# Patient Record
Sex: FEMALE | Race: BLACK OR AFRICAN AMERICAN | Employment: UNEMPLOYED | URBAN - METROPOLITAN AREA
[De-identification: names, ages, dates, MRNs, and addresses within clinical notes are randomized per-mention and may not be internally consistent; named-entity substitution may affect disease eponyms.]

---

## 2019-07-07 ENCOUNTER — APPOINTMENT (OUTPATIENT)
Dept: GENERAL RADIOLOGY | Age: 54
End: 2019-07-07
Attending: EMERGENCY MEDICINE
Payer: MEDICARE

## 2019-07-07 ENCOUNTER — HOSPITAL ENCOUNTER (EMERGENCY)
Age: 54
Discharge: HOME | End: 2019-07-07
Attending: EMERGENCY MEDICINE
Payer: MEDICARE

## 2019-07-07 VITALS
TEMPERATURE: 98.5 F | OXYGEN SATURATION: 95 % | RESPIRATION RATE: 16 BRPM | WEIGHT: 227.96 LBS | SYSTOLIC BLOOD PRESSURE: 123 MMHG | DIASTOLIC BLOOD PRESSURE: 57 MMHG | HEART RATE: 88 BPM

## 2019-07-07 DIAGNOSIS — M54.42 ACUTE LEFT-SIDED LOW BACK PAIN WITH LEFT-SIDED SCIATICA: Primary | ICD-10-CM

## 2019-07-07 PROCEDURE — 99283 EMERGENCY DEPT VISIT LOW MDM: CPT

## 2019-07-07 PROCEDURE — 74011250637 HC RX REV CODE- 250/637: Performed by: EMERGENCY MEDICINE

## 2019-07-07 PROCEDURE — 72100 X-RAY EXAM L-S SPINE 2/3 VWS: CPT

## 2019-07-07 RX ORDER — LIDOCAINE 50 MG/G
PATCH TOPICAL
Qty: 5 EACH | Refills: 0 | Status: SHIPPED | OUTPATIENT
Start: 2019-07-07 | End: 2020-02-19

## 2019-07-07 RX ORDER — OXYCODONE AND ACETAMINOPHEN 5; 325 MG/1; MG/1
TABLET ORAL
COMMUNITY
Start: 2017-12-22

## 2019-07-07 RX ORDER — METOPROLOL SUCCINATE 100 MG/1
TABLET, EXTENDED RELEASE ORAL
COMMUNITY
Start: 2019-06-26

## 2019-07-07 RX ORDER — ZOLPIDEM TARTRATE 10 MG/1
TABLET ORAL
COMMUNITY
Start: 2019-06-26

## 2019-07-07 RX ORDER — COLCHICINE 0.6 MG/1
TABLET ORAL
COMMUNITY
Start: 2017-11-30 | End: 2020-02-19

## 2019-07-07 RX ORDER — HYDROXYCHLOROQUINE SULFATE 200 MG/1
TABLET, FILM COATED ORAL
Refills: 0 | COMMUNITY
Start: 2019-06-29 | End: 2020-02-19

## 2019-07-07 RX ORDER — INSULIN GLARGINE AND LIXISENATIDE 100; 33 U/ML; UG/ML
INJECTION, SOLUTION SUBCUTANEOUS
Refills: 3 | COMMUNITY
Start: 2019-06-03 | End: 2021-03-24 | Stop reason: ALTCHOICE

## 2019-07-07 RX ORDER — SPIRONOLACTONE 25 MG/1
TABLET ORAL
Refills: 0 | COMMUNITY
Start: 2019-06-24

## 2019-07-07 RX ORDER — AMLODIPINE BESYLATE 5 MG/1
TABLET ORAL
COMMUNITY
Start: 2019-06-26

## 2019-07-07 RX ORDER — GLIMEPIRIDE 4 MG/1
TABLET ORAL
Refills: 3 | COMMUNITY
Start: 2019-06-23 | End: 2020-02-19

## 2019-07-07 RX ORDER — IBUPROFEN 600 MG/1
600 TABLET ORAL
Status: COMPLETED | OUTPATIENT
Start: 2019-07-07 | End: 2019-07-07

## 2019-07-07 RX ORDER — DICLOFENAC SODIUM 75 MG/1
TABLET, DELAYED RELEASE ORAL
COMMUNITY
Start: 2019-06-26 | End: 2020-02-19

## 2019-07-07 RX ORDER — ERGOCALCIFEROL 1.25 MG/1
CAPSULE ORAL
COMMUNITY
Start: 2017-12-13

## 2019-07-07 RX ORDER — AMITRIPTYLINE HYDROCHLORIDE 25 MG/1
TABLET, FILM COATED ORAL
COMMUNITY
Start: 2018-04-30 | End: 2020-02-19

## 2019-07-07 RX ADMIN — IBUPROFEN 600 MG: 600 TABLET ORAL at 09:58

## 2019-07-07 NOTE — DISCHARGE INSTRUCTIONS
Patient Education        Back Pain: Care Instructions  Your Care Instructions    Back pain has many possible causes. It is often related to problems with muscles and ligaments of the back. It may also be related to problems with the nerves, discs, or bones of the back. Moving, lifting, standing, sitting, or sleeping in an awkward way can strain the back. Sometimes you don't notice the injury until later. Arthritis is another common cause of back pain. Although it may hurt a lot, back pain usually improves on its own within several weeks. Most people recover in 12 weeks or less. Using good home treatment and being careful not to stress your back can help you feel better sooner. Follow-up care is a key part of your treatment and safety. Be sure to make and go to all appointments, and call your doctor if you are having problems. It's also a good idea to know your test results and keep a list of the medicines you take. How can you care for yourself at home? · Sit or lie in positions that are most comfortable and reduce your pain. Try one of these positions when you lie down:  ? Lie on your back with your knees bent and supported by large pillows. ? Lie on the floor with your legs on the seat of a sofa or chair. ? Lie on your side with your knees and hips bent and a pillow between your legs. ? Lie on your stomach if it does not make pain worse. · Do not sit up in bed, and avoid soft couches and twisted positions. Bed rest can help relieve pain at first, but it delays healing. Avoid bed rest after the first day of back pain. · Change positions every 30 minutes. If you must sit for long periods of time, take breaks from sitting. Get up and walk around, or lie in a comfortable position. · Try using a heating pad on a low or medium setting for 15 to 20 minutes every 2 or 3 hours. Try a warm shower in place of one session with the heating pad. · You can also try an ice pack for 10 to 15 minutes every 2 to 3 hours. Put a thin cloth between the ice pack and your skin. · Take pain medicines exactly as directed. ? If the doctor gave you a prescription medicine for pain, take it as prescribed. ? If you are not taking a prescription pain medicine, ask your doctor if you can take an over-the-counter medicine. · Take short walks several times a day. You can start with 5 to 10 minutes, 3 or 4 times a day, and work up to longer walks. Walk on level surfaces and avoid hills and stairs until your back is better. · Return to work and other activities as soon as you can. Continued rest without activity is usually not good for your back. · To prevent future back pain, do exercises to stretch and strengthen your back and stomach. Learn how to use good posture, safe lifting techniques, and proper body mechanics. When should you call for help? Call your doctor now or seek immediate medical care if:    · You have new or worsening numbness in your legs.     · You have new or worsening weakness in your legs. (This could make it hard to stand up.)     · You lose control of your bladder or bowels.    Watch closely for changes in your health, and be sure to contact your doctor if:    · You have a fever, lose weight, or don't feel well.     · You do not get better as expected. Where can you learn more? Go to http://abbey-jarad.info/. Enter O947 in the search box to learn more about \"Back Pain: Care Instructions. \"  Current as of: September 20, 2018  Content Version: 11.9  © 2960-2854 [employer]. Care instructions adapted under license by [employer] (which disclaims liability or warranty for this information). If you have questions about a medical condition or this instruction, always ask your healthcare professional. [place] [address] any warranty or liability for your use of this information.        Patient Education   Patient Education   Lidocaine Patch (On the skin) Lidocaine ([id number])    Brand Name(s): Aspercreme, DermacinRx PHN Trae, DermacinRx ZRM Trae, Dermazyl, Lidocaine Novaplus, [place], [place], [place], [place]   There may be other brand names for this medicine. When This Medicine Should Not Be Used: This medicine is not right for everyone. Do not use it if you had an allergic reaction to lidocaine or similar medicines. How to Use This Medicine:   Patch  · Your doctor will tell you how many patches to use, where to apply them, and how often to apply them. Do not use more patches or apply them more often than your doctor tells you to. · This medicine should be used only on the skin. Do not get it in your eyes, nose, or mouth. If it does get on these areas, rinse it off with water or saline right away. · Keep the patch in its envelope until you are ready to put it on. You may cut the patch into a smaller size with scissors before you take off the patch liner. · Wash your hands with soap and water before and after applying a patch. · Apply the patch to clean, dry skin. Do not put the patch over burns, cuts, or irritated skin. · The patch will not stick if it gets wet. Do not wear it when you take a bath or shower, or when you go swimming. · Do not wear the patch for longer than 12 hours in any 24-hour period. · Store the patches at room temperature in a closed container, away from heat, moisture, and direct light. · Fold the used patch in half with the sticky sides together. Throw any used patch away so that children or pets cannot get to it. You will also need to throw away old patches after the expiration date has passed. Drugs and Foods to Avoid:   Ask your doctor or pharmacist before using any other medicine, including over-the-counter medicines, vitamins, and herbal products. · Some foods and medicines can affect how lidocaine works.  Tell your doctor if you are using the following:  ¨ Medicine for heart rhythm problems, such as mexiletine  ¨ Other topical medicine  Warnings While Using This Medicine:   · Tell your doctor if you are pregnant or breastfeeding, or if you have liver disease. Tell your doctor if you are allergic to any other medicine. · Do not use a heating pad, electric blanket, or other heat source over the patch. .  · Keep all medicine out of the reach of children. Never share your medicine with anyone. Possible Side Effects While Using This Medicine:   Call your doctor right away if you notice any of these side effects:  · Allergic reaction: Itching or hives, swelling in your face or hands, swelling or tingling in your mouth or throat, chest tightness, trouble breathing  · Redness, itching, burning, swelling, or blisters where the patch is applied  If you notice other side effects that you think are caused by this medicine, tell your doctor. Call your doctor for medical advice about side effects. You may report side effects to FDA at 9-080-FDA-2182  © 2017 [facility] Information is for End User's use only and may not be sold, redistributed or otherwise used for commercial purposes. The above information is an  only. It is not intended as medical advice for individual conditions or treatments. Talk to your doctor, nurse or pharmacist before following any medical regimen to see if it is safe and effective for you. Sciatica: Care Instructions  Your Care Instructions    Sciatica (say \"qye-HN-oe-kuh\") is an irritation of one of the sciatic nerves, which come from the spinal cord in the lower back. The sciatic nerves and their branches extend down through the buttock to the foot. Sciatica can develop when an injured disc in the back presses against a spinal nerve root. Its main symptom is pain, numbness, or weakness that is often worse in the leg or foot than in the back. Sciatica often will improve and go away with time.  Early treatment usually includes medicines and exercises to relieve pain. Follow-up care is a key part of your treatment and safety. Be sure to make and go to all appointments, and call your doctor if you are having problems. It's also a good idea to know your test results and keep a list of the medicines you take. How can you care for yourself at home? · Take pain medicines exactly as directed. ? If the doctor gave you a prescription medicine for pain, take it as prescribed. ? If you are not taking a prescription pain medicine, ask your doctor if you can take an over-the-counter medicine. · Use heat or ice to relieve pain. ? To apply heat, put a warm water bottle, heating pad set on low, or warm cloth on your back. Do not go to sleep with a heating pad on your skin. ? To use ice, put ice or a cold pack on the area for 10 to 20 minutes at a time. Put a thin cloth between the ice and your skin. · Avoid sitting if possible, unless it feels better than standing. · Alternate lying down with short walks. Increase your walking distance as you are able to without making your symptoms worse. · Do not do anything that makes your symptoms worse. When should you call for help? Call 911 anytime you think you may need emergency care. For example, call if:    · You are unable to move a leg at all.   [facility] your doctor now or seek immediate medical care if:    · You have new or worse symptoms in your legs or buttocks. Symptoms may include:  ? Numbness or tingling. ? Weakness. ? Pain.     · You lose bladder or bowel control.    Watch closely for changes in your health, and be sure to contact your doctor if:    · You are not getting better as expected. Where can you learn more? Go to http://abbey-jarad.info/. Enter [phone] in the search box to learn more about \"Sciatica: Care Instructions. \"  Current as of: September 20, 2018  Content Version: 11.9  © 0332-0553 [employer].  Care instructions adapted under license by [employer] (which disclaims liability or warranty for this information). If you have questions about a medical condition or this instruction, always ask your healthcare professional. [address] any warranty or liability for your use of this information.

## 2019-07-07 NOTE — ED PROVIDER NOTES
HPI     47 yo female with h/o 12 years of a lumbar herniated disc, dm, htn now with worsening low back pain today and now intermittent numbness of left knee and toes without any weakness or incontinence. Some pain radiates down left leg. Denies fevers, abd pain, urinary complaints, trauma, falls or other complaints. Used 1/2 percocet last night without relief. Pain is severe and worse when walking. Pt reports her kidneys function normally based upon her old labwork by her doctor. She reports she can take ibuprofen. SHX:  Former smoker. + etoh. Past Medical History:   Diagnosis Date    Diabetes ([facility] [address]     Hypertension     Lumbar herniated disc        Past Surgical History:   Procedure Laterality Date    HX CHOLECYSTECTOMY      HX HYSTERECTOMY           History reviewed. No pertinent family history.     Social History     Socioeconomic History    Marital status: UNKNOWN     Spouse name: Not on file    Number of children: Not on file    Years of education: Not on file    Highest education level: Not on file   Occupational History    Not on file   Social Needs    Financial resource strain: Not on file    Food insecurity:     Worry: Not on file     Inability: Not on file    Transportation needs:     Medical: Not on file     Non-medical: Not on file   Tobacco Use    Smoking status: Former Smoker    Smokeless tobacco: Never Used   Substance and Sexual Activity    Alcohol use: Yes     Comment: ocasionally    Drug use: Never    Sexual activity: Not on file   Lifestyle    Physical activity:     Days per week: Not on file     Minutes per session: Not on file    Stress: Not on file   Relationships    Social connections:     Talks on phone: Not on file     Gets together: Not on file     Attends Gnosticist service: Not on file     Active member of club or organization: Not on file     Attends meetings of clubs or organizations: Not on file     Relationship status: Not on file    Intimate partner violence:     Fear of current or ex partner: Not on file     Emotionally abused: Not on file     Physically abused: Not on file     Forced sexual activity: Not on file   Other Topics Concern    Not on file   Social History Narrative    Not on file         ALLERGIES: Patient has no known allergies. Review of Systems   Constitutional: Negative for chills and fever. Gastrointestinal: Negative for abdominal pain, nausea and vomiting. Genitourinary: Negative for dysuria, frequency and urgency. Musculoskeletal: Positive for back pain. Neurological: Positive for numbness. Negative for weakness. All other systems reviewed and are negative. Vitals:    07/07/19 0939   BP: 140/70   Pulse: 99   Resp: 16   Temp: 98.5 °F (36.9 °C)   SpO2: 97%   Weight: 103.4 kg (227 lb 15.3 oz)            Physical Exam   Constitutional: She is oriented to person, place, and time. She appears well-developed and well-nourished. No distress. Cardiovascular: Normal rate and normal heart sounds. Pulmonary/Chest: Effort normal and breath sounds normal.   Abdominal: Soft. There is no tenderness. Musculoskeletal: Normal range of motion. She exhibits tenderness (lower lumbar spine). Neurological: She is alert and oriented to person, place, and time. No sensory deficit (light touch sensation intact to both feet and lower legs). Negative straight leg raise of left leg.  5/5 knee flex and ext, 5/5 ankle flexion. Skin: Skin is warm and dry. Nursing note and vitals reviewed. MDM     47 yo female here with chronic and acutely worsening low back pain. C/o numbness to left leg yet light touch sensation intact. Neuro intact. Afebrile. Will check lumbar x-ray and give ibuprofen. Procedures    10:32 AM  TREAT WITH DICLOFENAC WHICH PATIENT IS ALREADY TAKING. LIDODERM PATCH. AVOIDING STEROIDS GIVEN H/O DM. NEURO INTACT. ADVISED TO RETURN IF ANY WEAKNESS, FEVER OR INCONTINENCE.   PT PLANS TO F/U WITH HER PCP THIS WEEK. Patient's results have been reviewed with them. Patient and/or family have verbally conveyed their understanding and agreement of the patient's signs, symptoms, diagnosis, treatment and prognosis and additionally agree to follow up as recommended or return to the Emergency Room should their condition change prior to follow-up. Discharge instructions have also been provided to the patient with some educational information regarding their diagnosis as well a list of reasons why they would want to return to the ER prior to their follow-up appointment should their condition change. No results found for this or any previous visit (from the past 24 hour(s)). Xr Spine Lumb 2 Or 3 V    Result Date: 7/7/2019  EXAM:  XR SPINE LUMB 2 OR 3 V INDICATION: Low back pain COMPARISON: None. TECHNIQUE: 3 views lumbar spine FINDINGS: There is no acute fracture or subluxation. Vertebral body heights and intervertebral disc spaces are maintained. There is diffuse lumbar facet arthrosis. There is no abnormality in alignment. IMPRESSION: No acute abnormality. Diffuse lumbar facet arthrosis.

## 2019-07-07 NOTE — ED NOTES
Pt mediated. While in room pt dropped her water on the floor. Observed no difficulty bending to  water off floor. Dr. Tasneem Ronquillo notified.

## 2019-07-07 NOTE — ED TRIAGE NOTES
Triage note: Pt states she has a hx of a herniated disk for several years. States yesterday her back pain worsened. Took 1/2 percocet last night with little relief. Pt drove herself to the emergency room, brought back by wheelchair and is having pain with ambulation. Pt able to ambulate with slight limp.

## 2019-07-07 NOTE — ED NOTES
Patient was discharged and given instructions by Dr. Carlos Uriostegui. Patient verbalized good understanding of all discharge instructions, prescriptions and f/u care. All questions answered. Pt in stable condition on discharge.

## 2020-02-19 ENCOUNTER — HOSPITAL ENCOUNTER (EMERGENCY)
Age: 55
Discharge: HOME | End: 2020-02-19
Attending: EMERGENCY MEDICINE
Payer: MEDICARE

## 2020-02-19 VITALS
TEMPERATURE: 97.4 F | WEIGHT: 228.18 LBS | SYSTOLIC BLOOD PRESSURE: 159 MMHG | OXYGEN SATURATION: 98 % | DIASTOLIC BLOOD PRESSURE: 90 MMHG | RESPIRATION RATE: 16 BRPM | HEART RATE: 81 BPM | HEIGHT: 63 IN | BODY MASS INDEX: 40.43 KG/M2

## 2020-02-19 DIAGNOSIS — R73.9 HYPERGLYCEMIA: ICD-10-CM

## 2020-02-19 DIAGNOSIS — R42 DIZZINESS: Primary | ICD-10-CM

## 2020-02-19 LAB
ALBUMIN SERPL-MCNC: 3.6 G/DL (ref 3.5–5)
ALBUMIN/GLOB SERPL: 0.7 {RATIO} (ref 1.1–2.2)
ALP SERPL-CCNC: 86 U/L (ref 45–117)
ALT SERPL-CCNC: 44 U/L (ref 12–78)
ANION GAP SERPL CALC-SCNC: 13 MMOL/L (ref 5–15)
APPEARANCE UR: ABNORMAL
AST SERPL-CCNC: 23 U/L (ref 15–37)
BACTERIA URNS QL MICRO: ABNORMAL /HPF
BASOPHILS # BLD: 0 K/UL (ref 0–0.1)
BASOPHILS NFR BLD: 0 % (ref 0–1)
BILIRUB SERPL-MCNC: 0.6 MG/DL (ref 0.2–1)
BILIRUB UR QL: NEGATIVE
BUN SERPL-MCNC: 24 MG/DL (ref 6–20)
BUN/CREAT SERPL: 18 (ref 12–20)
CALCIUM SERPL-MCNC: 9.4 MG/DL (ref 8.5–10.1)
CHLORIDE SERPL-SCNC: 97 MMOL/L (ref 97–108)
CO2 SERPL-SCNC: 25 MMOL/L (ref 21–32)
COLOR UR: ABNORMAL
COMMENT, HOLDF: NORMAL
CREAT SERPL-MCNC: 1.35 MG/DL (ref 0.55–1.02)
DIFFERENTIAL METHOD BLD: NORMAL
EOSINOPHIL # BLD: 0.1 K/UL (ref 0–0.4)
EOSINOPHIL NFR BLD: 2 % (ref 0–7)
EPITH CASTS URNS QL MICRO: ABNORMAL /LPF
ERYTHROCYTE [DISTWIDTH] IN BLOOD BY AUTOMATED COUNT: 12.9 % (ref 11.5–14.5)
GLOBULIN SER CALC-MCNC: 5 G/DL (ref 2–4)
GLUCOSE BLD STRIP.AUTO-MCNC: 384 MG/DL (ref 65–100)
GLUCOSE BLD STRIP.AUTO-MCNC: 439 MG/DL (ref 65–100)
GLUCOSE SERPL-MCNC: 433 MG/DL (ref 65–100)
GLUCOSE UR STRIP.AUTO-MCNC: >1000 MG/DL
HCT VFR BLD AUTO: 40.6 % (ref 35–47)
HGB BLD-MCNC: 13.4 G/DL (ref 11.5–16)
HGB UR QL STRIP: ABNORMAL
IMM GRANULOCYTES # BLD AUTO: 0 K/UL (ref 0–0.04)
IMM GRANULOCYTES NFR BLD AUTO: 0 % (ref 0–0.5)
KETONES UR QL STRIP.AUTO: NEGATIVE MG/DL
LEUKOCYTE ESTERASE UR QL STRIP.AUTO: NEGATIVE
LYMPHOCYTES # BLD: 3.4 K/UL (ref 0.8–3.5)
LYMPHOCYTES NFR BLD: 45 % (ref 12–49)
MCH RBC QN AUTO: 29.7 PG (ref 26–34)
MCHC RBC AUTO-ENTMCNC: 33 G/DL (ref 30–36.5)
MCV RBC AUTO: 90 FL (ref 80–99)
MONOCYTES # BLD: 0.6 K/UL (ref 0–1)
MONOCYTES NFR BLD: 8 % (ref 5–13)
NEUTS SEG # BLD: 3.4 K/UL (ref 1.8–8)
NEUTS SEG NFR BLD: 45 % (ref 32–75)
NITRITE UR QL STRIP.AUTO: NEGATIVE
NRBC # BLD: 0 K/UL (ref 0–0.01)
NRBC BLD-RTO: 0 PER 100 WBC
PH UR STRIP: 5.5 [PH] (ref 5–8)
PLATELET # BLD AUTO: 292 K/UL (ref 150–400)
PMV BLD AUTO: 10.6 FL (ref 8.9–12.9)
POTASSIUM SERPL-SCNC: 4.5 MMOL/L (ref 3.5–5.1)
PROT SERPL-MCNC: 8.6 G/DL (ref 6.4–8.2)
PROT UR STRIP-MCNC: NEGATIVE MG/DL
RBC # BLD AUTO: 4.51 M/UL (ref 3.8–5.2)
RBC #/AREA URNS HPF: ABNORMAL /HPF (ref 0–5)
SAMPLES BEING HELD,HOLD: NORMAL
SERVICE CMNT-IMP: ABNORMAL
SERVICE CMNT-IMP: ABNORMAL
SODIUM SERPL-SCNC: 135 MMOL/L (ref 136–145)
SP GR UR REFRACTOMETRY: 1.01 (ref 1–1.03)
UR CULT HOLD, URHOLD: NORMAL
UROBILINOGEN UR QL STRIP.AUTO: 0.2 EU/DL (ref 0.2–1)
WBC # BLD AUTO: 7.5 K/UL (ref 3.6–11)
WBC URNS QL MICRO: ABNORMAL /HPF (ref 0–4)

## 2020-02-19 PROCEDURE — 82962 GLUCOSE BLOOD TEST: CPT

## 2020-02-19 PROCEDURE — 85025 COMPLETE CBC W/AUTO DIFF WBC: CPT

## 2020-02-19 PROCEDURE — 74011636637 HC RX REV CODE- 636/637: Performed by: EMERGENCY MEDICINE

## 2020-02-19 PROCEDURE — 36415 COLL VENOUS BLD VENIPUNCTURE: CPT

## 2020-02-19 PROCEDURE — 80053 COMPREHEN METABOLIC PANEL: CPT

## 2020-02-19 PROCEDURE — 81001 URINALYSIS AUTO W/SCOPE: CPT

## 2020-02-19 PROCEDURE — 99285 EMERGENCY DEPT VISIT HI MDM: CPT

## 2020-02-19 PROCEDURE — 74011250636 HC RX REV CODE- 250/636: Performed by: EMERGENCY MEDICINE

## 2020-02-19 PROCEDURE — 96374 THER/PROPH/DIAG INJ IV PUSH: CPT

## 2020-02-19 RX ORDER — ATORVASTATIN CALCIUM 40 MG/1
40 TABLET, FILM COATED ORAL DAILY
COMMUNITY

## 2020-02-19 RX ORDER — IBUPROFEN 800 MG/1
800 TABLET ORAL
COMMUNITY

## 2020-02-19 RX ORDER — LEFLUNOMIDE 20 MG/1
20 TABLET ORAL DAILY
COMMUNITY

## 2020-02-19 RX ADMIN — HUMAN INSULIN 8 UNITS: 100 INJECTION, SOLUTION SUBCUTANEOUS at 11:50

## 2020-02-19 RX ADMIN — SODIUM CHLORIDE 1000 ML: 900 INJECTION, SOLUTION INTRAVENOUS at 11:50

## 2020-02-19 RX ADMIN — SODIUM CHLORIDE 1000 ML: 900 INJECTION, SOLUTION INTRAVENOUS at 11:41

## 2020-02-19 NOTE — ED NOTES
Pt resting on stretcher. IVF infusing. Updated on POC. Denies any needs. Assessment unchanged. Call light within reach.

## 2020-02-19 NOTE — ED PROVIDER NOTES
51-year-old -American female presents to the emergency department with lightheadedness, headache, elevated blood sugar. Patient reports she is feeling lightheaded for the last 24 hours. She feels some tightness on the left side of her head. She says she checked her blood sugar and it was over 400. She says her blood pressure has been running fine in the 422H to 232 systolic. Patient denies any chest pain or shortness of breath. No abdominal pain. No vomiting. She feels lightheaded if she stands. She has been urinating frequently. She says her blood sugars been high over the past several days. She is insulin-dependent diabetic. No vomiting. No fevers. No cough. Patient denies any pain. She denies alcohol or tobacco every day. Past Medical History:   Diagnosis Date    Diabetes ([facility] [address]     Hypertension     Lumbar herniated disc        Past Surgical History:   Procedure Laterality Date    HX CHOLECYSTECTOMY      HX HYSTERECTOMY           No family history on file.     Social History     Socioeconomic History    Marital status:      Spouse name: Not on file    Number of children: Not on file    Years of education: Not on file    Highest education level: Not on file   Occupational History    Not on file   Social Needs    Financial resource strain: Not on file    Food insecurity:     Worry: Not on file     Inability: Not on file    Transportation needs:     Medical: Not on file     Non-medical: Not on file   Tobacco Use    Smoking status: Former Smoker    Smokeless tobacco: Never Used   Substance and Sexual Activity    Alcohol use: Yes     Comment: ocasionally    Drug use: Never    Sexual activity: Not on file   Lifestyle    Physical activity:     Days per week: Not on file     Minutes per session: Not on file    Stress: Not on file   Relationships    Social connections:     Talks on phone: Not on file     Gets together: Not on file     Attends Restorationism service: Not on file     Active member of club or organization: Not on file     Attends meetings of clubs or organizations: Not on file     Relationship status: Not on file    Intimate partner violence:     Fear of current or ex partner: Not on file     Emotionally abused: Not on file     Physically abused: Not on file     Forced sexual activity: Not on file   Other Topics Concern    Not on file   Social History Narrative    Not on file         ALLERGIES: Patient has no known allergies. Review of Systems   Constitutional: Negative for fever. HENT: Negative for facial swelling. Eyes: Negative for pain. Respiratory: Negative for cough, chest tightness and shortness of breath. Cardiovascular: Negative for chest pain and leg swelling. Gastrointestinal: Negative for abdominal pain and vomiting. Endocrine: Positive for polyuria. Genitourinary: Negative for difficulty urinating and flank pain. Musculoskeletal: Negative for arthralgias and back pain. Skin: Negative for color change. Allergic/Immunologic: Negative for immunocompromised state. Neurological: Positive for dizziness and headaches. Hematological: Does not bruise/bleed easily. Psychiatric/Behavioral: Negative for confusion. All other systems reviewed and are negative. There were no vitals filed for this visit. Physical Exam  Vitals signs and nursing note reviewed. Constitutional:       Appearance: She is well-developed. HENT:      Head: Normocephalic and atraumatic. Right Ear: External ear normal.      Left Ear: External ear normal.      Nose: Nose normal.   Eyes:      General: No scleral icterus. Pupils: Pupils are equal, round, and reactive to light. Neck:      Musculoskeletal: Normal range of motion and neck supple. Thyroid: No thyromegaly. Vascular: No JVD. Trachea: No tracheal deviation. Cardiovascular:      Rate and Rhythm: Normal rate and regular rhythm.       Heart sounds: Normal heart sounds. No murmur. No friction rub. Pulmonary:      Effort: Pulmonary effort is normal. No respiratory distress. Breath sounds: Normal breath sounds. No stridor. No wheezing or rales. Chest:      Chest wall: No tenderness. Abdominal:      General: Bowel sounds are normal. There is no distension. Palpations: Abdomen is soft. Tenderness: There is no abdominal tenderness. There is no guarding or rebound. Musculoskeletal: Normal range of motion. General: No tenderness. Lymphadenopathy:      Cervical: No cervical adenopathy. Skin:     General: Skin is warm and dry. Findings: No erythema or rash. Neurological:      Mental Status: She is alert and oriented to person, place, and time. Cranial Nerves: No cranial nerve deficit. Coordination: Coordination normal.      Deep Tendon Reflexes: Reflexes are normal and symmetric. Psychiatric:         Behavior: Behavior normal.         Thought Content: Thought content normal.         Judgment: Judgment normal.          MDM  Number of Diagnoses or Management Options  Diagnosis management comments: We will check blood sugar. Will give IV fluids. Will check urinalysis. Will reassess after blood sugar is back. Will check basic blood work to check electrolytes. Patient agrees.        Amount and/or Complexity of Data Reviewed  Clinical lab tests: ordered and reviewed  Tests in the medicine section of CPT®: ordered and reviewed  Decide to obtain previous medical records or to obtain history from someone other than the patient: yes  Review and summarize past medical records: yes  Independent visualization of images, tracings, or specimens: yes    Risk of Complications, Morbidity, and/or Mortality  Presenting problems: high  Diagnostic procedures: high  Management options: high           Procedures    11:41 AM  Blood glucose is 439  Will give IVF and Insulin and reassess shortly    12:46 PM  Pt states she's feeling improved    No DKA    UA is clear    Repeat glucose is: 380    Good return precautions given to patient. Close follow up with PCP recommended. Patient and/or family voices understanding of this plan. Discharge instructions were explained by me and all concerns were addressed.

## 2020-02-19 NOTE — DISCHARGE INSTRUCTIONS
Patient Education        Learning About High Blood Sugar  What is high blood sugar? Your body turns the food you eat into glucose (sugar), which it uses for energy. But if your body isn't able to use the sugar right away, it can build up in your blood and lead to high blood sugar. When the amount of sugar in your blood stays too high for too much of the time, you may have diabetes. Diabetes is a disease that can cause serious health problems. The good news is that lifestyle changes may help you get your blood sugar back to normal and avoid or delay diabetes. What causes high blood sugar? Sugar (glucose) can build up in your blood if you:  · Are overweight. · Have a family history of diabetes. · Take certain medicines, such as steroids. What are the symptoms? Having high blood sugar may not cause any symptoms at all. Or it may make you feel very thirsty or very hungry. You may also urinate more often than usual, have blurry vision, or lose weight without trying. How is high blood sugar treated? You can take steps to lower your blood sugar level if you understand what makes it get higher. Your doctor may want you to learn how to test your blood sugar level at home. Then you can see how illness, stress, or different kinds of food or medicine raise or lower your blood sugar level. Other tests may be needed to see if you have diabetes. How can you prevent high blood sugar? · Watch your weight. If you're overweight, losing just a small amount of weight may help. Reducing fat around your waist is most important. · Limit the amount of calories, sweets, and unhealthy fat you eat. Ask your doctor if a dietitian can help you. A registered dietitian can help you create meal plans that fit your lifestyle. · Get at least 30 minutes of exercise on most days of the week. Exercise helps control your blood sugar. It also helps you maintain a healthy weight. Walking is a good choice.  You also may want to do other activities, such as running, swimming, cycling, or playing tennis or team sports. · If your doctor prescribed medicines, take them exactly as prescribed. Call your doctor if you think you are having a problem with your medicine. You will get more details on the specific medicines your doctor prescribes. Follow-up care is a key part of your treatment and safety. Be sure to make and go to all appointments, and call your doctor if you are having problems. It's also a good idea to know your test results and keep a list of the medicines you take. Where can you learn more? Go to http://abbey-jarad.info/. Enter O108 in the search box to learn more about \"Learning About High Blood Sugar. \"  Current as of: April 16, 2019  Content Version: 12.2  © 6538-0410 [employer]. Care instructions adapted under license by [employer] (which disclaims liability or warranty for this information). If you have questions about a medical condition or this instruction, always ask your healthcare professional. [address] any warranty or liability for your use of this information.

## 2020-02-19 NOTE — ED TRIAGE NOTES
Pt arrives ambulatory to ed with complaints of Ha to the left temporal HA and hyperglycemia. Per patient BS in the 400's x 3 days. No pain medications for HA. Denies any other complaints.

## 2020-09-06 ENCOUNTER — APPOINTMENT (OUTPATIENT)
Dept: ULTRASOUND IMAGING | Age: 55
End: 2020-09-06
Attending: EMERGENCY MEDICINE
Payer: MEDICARE

## 2020-09-06 ENCOUNTER — HOSPITAL ENCOUNTER (EMERGENCY)
Age: 55
Discharge: HOME | End: 2020-09-06
Attending: EMERGENCY MEDICINE
Payer: MEDICARE

## 2020-09-06 ENCOUNTER — APPOINTMENT (OUTPATIENT)
Dept: GENERAL RADIOLOGY | Age: 55
End: 2020-09-06
Attending: EMERGENCY MEDICINE
Payer: MEDICARE

## 2020-09-06 VITALS
BODY MASS INDEX: 42.93 KG/M2 | WEIGHT: 242.29 LBS | SYSTOLIC BLOOD PRESSURE: 154 MMHG | DIASTOLIC BLOOD PRESSURE: 82 MMHG | OXYGEN SATURATION: 97 % | TEMPERATURE: 98.2 F | RESPIRATION RATE: 18 BRPM | HEART RATE: 99 BPM | HEIGHT: 63 IN

## 2020-09-06 DIAGNOSIS — R60.0 LEG EDEMA, LEFT: Primary | ICD-10-CM

## 2020-09-06 DIAGNOSIS — N18.9 CHRONIC KIDNEY DISEASE, UNSPECIFIED CKD STAGE: ICD-10-CM

## 2020-09-06 DIAGNOSIS — M54.32 LEFT SIDED SCIATICA: ICD-10-CM

## 2020-09-06 LAB
ANION GAP SERPL CALC-SCNC: 10 MMOL/L (ref 5–15)
BASOPHILS # BLD: 0.1 K/UL (ref 0–0.1)
BASOPHILS NFR BLD: 1 % (ref 0–1)
BNP SERPL-MCNC: 190 PG/ML (ref 0–125)
BUN SERPL-MCNC: 23 MG/DL (ref 6–20)
BUN/CREAT SERPL: 15 (ref 12–20)
CALCIUM SERPL-MCNC: 9.4 MG/DL (ref 8.5–10.1)
CHLORIDE SERPL-SCNC: 100 MMOL/L (ref 97–108)
CO2 SERPL-SCNC: 26 MMOL/L (ref 21–32)
COMMENT, HOLDF: NORMAL
CREAT SERPL-MCNC: 1.57 MG/DL (ref 0.55–1.02)
DIFFERENTIAL METHOD BLD: ABNORMAL
EOSINOPHIL # BLD: 0.3 K/UL (ref 0–0.4)
EOSINOPHIL NFR BLD: 4 % (ref 0–7)
ERYTHROCYTE [DISTWIDTH] IN BLOOD BY AUTOMATED COUNT: 14.8 % (ref 11.5–14.5)
GLUCOSE SERPL-MCNC: 287 MG/DL (ref 65–100)
HCT VFR BLD AUTO: 37 % (ref 35–47)
HGB BLD-MCNC: 11.9 G/DL (ref 11.5–16)
IMM GRANULOCYTES # BLD AUTO: 0 K/UL (ref 0–0.04)
IMM GRANULOCYTES NFR BLD AUTO: 1 % (ref 0–0.5)
LYMPHOCYTES # BLD: 3.6 K/UL (ref 0.8–3.5)
LYMPHOCYTES NFR BLD: 41 % (ref 12–49)
MCH RBC QN AUTO: 30.7 PG (ref 26–34)
MCHC RBC AUTO-ENTMCNC: 32.2 G/DL (ref 30–36.5)
MCV RBC AUTO: 95.4 FL (ref 80–99)
MONOCYTES # BLD: 0.9 K/UL (ref 0–1)
MONOCYTES NFR BLD: 10 % (ref 5–13)
NEUTS SEG # BLD: 3.8 K/UL (ref 1.8–8)
NEUTS SEG NFR BLD: 43 % (ref 32–75)
NRBC # BLD: 0 K/UL (ref 0–0.01)
NRBC BLD-RTO: 0 PER 100 WBC
PLATELET # BLD AUTO: 251 K/UL (ref 150–400)
PMV BLD AUTO: 10.7 FL (ref 8.9–12.9)
POTASSIUM SERPL-SCNC: 4.1 MMOL/L (ref 3.5–5.1)
RBC # BLD AUTO: 3.88 M/UL (ref 3.8–5.2)
SAMPLES BEING HELD,HOLD: NORMAL
SODIUM SERPL-SCNC: 136 MMOL/L (ref 136–145)
TROPONIN I SERPL-MCNC: <0.05 NG/ML
WBC # BLD AUTO: 8.6 K/UL (ref 3.6–11)

## 2020-09-06 PROCEDURE — 99284 EMERGENCY DEPT VISIT MOD MDM: CPT

## 2020-09-06 PROCEDURE — 85025 COMPLETE CBC W/AUTO DIFF WBC: CPT

## 2020-09-06 PROCEDURE — 80048 BASIC METABOLIC PNL TOTAL CA: CPT

## 2020-09-06 PROCEDURE — 36415 COLL VENOUS BLD VENIPUNCTURE: CPT

## 2020-09-06 PROCEDURE — 83880 ASSAY OF NATRIURETIC PEPTIDE: CPT

## 2020-09-06 PROCEDURE — 84484 ASSAY OF TROPONIN QUANT: CPT

## 2020-09-06 PROCEDURE — 93005 ELECTROCARDIOGRAM TRACING: CPT

## 2020-09-06 PROCEDURE — 72100 X-RAY EXAM L-S SPINE 2/3 VWS: CPT

## 2020-09-06 PROCEDURE — 74011250637 HC RX REV CODE- 250/637: Performed by: EMERGENCY MEDICINE

## 2020-09-06 PROCEDURE — 74011000250 HC RX REV CODE- 250: Performed by: EMERGENCY MEDICINE

## 2020-09-06 PROCEDURE — 93971 EXTREMITY STUDY: CPT

## 2020-09-06 RX ORDER — ACETAMINOPHEN 325 MG/1
650 TABLET ORAL
Status: COMPLETED | OUTPATIENT
Start: 2020-09-06 | End: 2020-09-06

## 2020-09-06 RX ORDER — LIDOCAINE 50 MG/G
PATCH TOPICAL
Qty: 5 EACH | Refills: 0 | Status: SHIPPED | OUTPATIENT
Start: 2020-09-06 | End: 2020-09-06

## 2020-09-06 RX ORDER — LIDOCAINE 4 G/100G
1 PATCH TOPICAL
Status: DISCONTINUED | OUTPATIENT
Start: 2020-09-06 | End: 2020-09-06 | Stop reason: HOSPADM

## 2020-09-06 RX ORDER — LIDOCAINE 50 MG/G
PATCH TOPICAL
Qty: 5 EACH | Refills: 0 | Status: SHIPPED | OUTPATIENT
Start: 2020-09-06

## 2020-09-06 RX ADMIN — ACETAMINOPHEN 650 MG: 325 TABLET ORAL at 18:22

## 2020-09-06 NOTE — ED PROVIDER NOTES
HPI      82-year-old female with a history of diabetes, hyperlipidemia, hypertension here with left leg pain, bilateral leg swelling worse on the left x2 weeks. Patient has a history of sciatica in the past.  She believes that the pain is from that. She is now had recent bilateral leg swelling greater on the left. Denies any chest pain, shortness of breath, dyspnea on exertion, orthopnea, fevers, cough or other complaints. No prior blood clots. Social history: Former smoker. No alcohol currently. Past Medical History:   Diagnosis Date    Diabetes ([facility] [address]     Hyperlipidemia     Hypertension     Lumbar herniated disc     Lupus ([facility] [address]        Past Surgical History:   Procedure Laterality Date    HX CHOLECYSTECTOMY      HX HYSTERECTOMY           History reviewed. No pertinent family history.     Social History     Socioeconomic History    Marital status:      Spouse name: Not on file    Number of children: Not on file    Years of education: Not on file    Highest education level: Not on file   Occupational History    Not on file   Social Needs    Financial resource strain: Not on file    Food insecurity     Worry: Not on file     Inability: Not on file    Transportation needs     Medical: Not on file     Non-medical: Not on file   Tobacco Use    Smoking status: Former Smoker    Smokeless tobacco: Never Used   Substance and Sexual Activity    Alcohol use: Not Currently     Comment: ocasionally    Drug use: Never    Sexual activity: Not on file   Lifestyle    Physical activity     Days per week: Not on file     Minutes per session: Not on file    Stress: Not on file   Relationships    Social connections     Talks on phone: Not on file     Gets together: Not on file     Attends Qu[employer] service: Not on file     Active member of club or organization: Not on file     Attends meetings of clubs or organizations: Not on file     Relationship status: Not on file    Intimate partner violence Fear of current or ex partner: Not on file     Emotionally abused: Not on file     Physically abused: Not on file     Forced sexual activity: Not on file   Other Topics Concern    Not on file   Social History Narrative    Not on file         ALLERGIES: Patient has no known allergies. Review of Systems   Cardiovascular: Positive for leg swelling. Negative for chest pain. Musculoskeletal: Positive for back pain. LEFT LEG PAIN   All other systems reviewed and are negative. Vitals:    09/06/20 1609 09/06/20 1700   BP: 165/89 154/82   Pulse: 99    Resp: 18    Temp: 98.5 °F (36.9 °C)    SpO2: 99% 97%   Weight: 109.9 kg (242 lb 4.6 oz)    Height: 5' 3\" (1.6 m)             Physical Exam     Nursing note and vitals reviewed. Constitutional: appears well-developed and well-nourished. No distress. HENT:   Head: Normocephalic and atraumatic. Sclera anicteric  Nose: No rhinorrhea  Mouth/Throat: Oropharynx is clear and moist. Pharynx normal  Eyes: Conjunctivae are normal. Pupils are equal, round, and reactive to light. Right eye exhibits no discharge. Left eye exhibits no discharge. No scleral icterus. Neck: Painless normal range of motion. Supple  Cardiovascular: Normal rate, regular rhythm, normal heart sounds and intact distal pulses. Exam reveals no gallop and no friction rub. No murmur heard. Pulmonary/Chest: Effort normal and breath sounds normal. No respiratory distress. no wheezes. no rales. Abdominal: Soft. Bowel sounds are normal. Exhibits no distension and no mass. No tenderness. No guarding. Musculoskeletal: Normal range of motion. no tenderness. 2+ LEFT AND 1+ RIGHT EDEMA. Lymphadenopathy:   No cervical adenopathy. Neurological:  Alert and oriented to person, place, and time. Coordination normal. CN 2-12 intact. Moving all extremities. Skin: Skin is warm and dry. No rash noted. No pallor.          MDM     70-year-old female with bilateral leg swelling worse on the left as well as left sciatica-like pain. Given her cardiac risk factors, will get check EKG, labs, left lower extremity Doppler for DVT study given the asymmetry. Lidocaine patch and Tylenol. Procedures          ED EKG interpretation:  Rhythm: sinus tachycardia; and regular . Rate (approx.): 111; Axis: normal; P wave: normal; QRS interval: normal ; ST/T wave: normal;. This EKG was interpreted by Yuliana Mclain MD,ED Provider. 7:33 PM  No dvt prelim on ultrasound. 7:33 PM  Patient's results have been reviewed with them. Patient and/or family have verbally conveyed their understanding and agreement of the patient's signs, symptoms, diagnosis, treatment and prognosis and additionally agree to follow up as recommended or return to the Emergency Room should their condition change prior to follow-up. Discharge instructions have also been provided to the patient with some educational information regarding their diagnosis as well a list of reasons why they would want to return to the ER prior to their follow-up appointment should their condition change.         Recent Results (from the past 24 hour(s))   EKG, 12 LEAD, INITIAL    Collection Time: 09/06/20  6:10 PM   Result Value Ref Range    Ventricular Rate 111 BPM    Atrial Rate 111 BPM    P-R Interval 146 ms    QRS Duration 72 ms    Q-T Interval 350 ms    QTC Calculation (Bezet) 476 ms    Calculated P Axis 47 degrees    Calculated R Axis 24 degrees    Calculated T Axis 31 degrees    Diagnosis       Sinus tachycardia  Otherwise normal ECG  No previous ECGs available     METABOLIC PANEL, BASIC    Collection Time: 09/06/20  6:18 PM   Result Value Ref Range    Sodium 136 136 - 145 mmol/L    Potassium 4.1 3.5 - 5.1 mmol/L    Chloride 100 97 - 108 mmol/L    CO2 26 21 - 32 mmol/L    Anion gap 10 5 - 15 mmol/L    Glucose 287 (H) 65 - 100 mg/dL    BUN 23 (H) 6 - 20 MG/DL    Creatinine 1.57 (H) 0.55 - 1.02 MG/DL    BUN/Creatinine ratio 15 12 - 20      GFR est AA 42 (L) >60 ml/min/1.73m2    GFR est non-AA 34 (L) >60 ml/min/1.73m2    Calcium 9.4 8.5 - 10.1 MG/DL   CBC WITH AUTOMATED DIFF    Collection Time: 09/06/20  6:18 PM   Result Value Ref Range    WBC 8.6 3.6 - 11.0 K/uL    RBC 3.88 3.80 - 5.20 M/uL    HGB 11.9 11.5 - 16.0 g/dL    HCT 37.0 35.0 - 47.0 %    MCV 95.4 80.0 - 99.0 FL    MCH 30.7 26.0 - 34.0 PG    MCHC 32.2 30.0 - 36.5 g/dL    RDW 14.8 (H) 11.5 - 14.5 %    PLATELET 262 101 - 787 K/uL    MPV 10.7 8.9 - 12.9 FL    NRBC 0.0 0  WBC    ABSOLUTE NRBC 0.00 0.00 - 0.01 K/uL    NEUTROPHILS 43 32 - 75 %    LYMPHOCYTES 41 12 - 49 %    MONOCYTES 10 5 - 13 %    EOSINOPHILS 4 0 - 7 %    BASOPHILS 1 0 - 1 %    IMMATURE GRANULOCYTES 1 (H) 0.0 - 0.5 %    ABS. NEUTROPHILS 3.8 1.8 - 8.0 K/UL    ABS. LYMPHOCYTES 3.6 (H) 0.8 - 3.5 K/UL    ABS. MONOCYTES 0.9 0.0 - 1.0 K/UL    ABS. EOSINOPHILS 0.3 0.0 - 0.4 K/UL    ABS. BASOPHILS 0.1 0.0 - 0.1 K/UL    ABS. IMM. GRANS. 0.0 0.00 - 0.04 K/UL    DF AUTOMATED     NT-PRO BNP    Collection Time: 09/06/20  6:18 PM   Result Value Ref Range    NT pro- (H) 0 - 125 PG/ML   TROPONIN I    Collection Time: 09/06/20  6:18 PM   Result Value Ref Range    Troponin-I, Qt. <0.05 <0.05 ng/mL   SAMPLES BEING HELD    Collection Time: 09/06/20  6:18 PM   Result Value Ref Range    SAMPLES BEING HELD 1RED 1BLUE     COMMENT        Add-on orders for these samples will be processed based on acceptable specimen integrity and analyte stability, which may vary by analyte. Xr Spine Lumb 2 Or 3 V    Result Date: 9/6/2020  INDICATION:  low back pain EXAM: 3 views lumbar spine. Comparison 7/7/2019. FINDINGS: Alignment is normal. Disc spaces are preserved. There is however multilevel facet arthropathy and degeneration of the left hip No bony destructive lesions or fractures. Aorta is incompletely calcified     IMPRESSION: 1. Multilevel facet arthropathy. Unchanged 2.  Degeneration of the left hip

## 2020-09-07 LAB
ATRIAL RATE: 111 BPM
CALCULATED P AXIS, ECG09: 47 DEGREES
CALCULATED R AXIS, ECG10: 24 DEGREES
CALCULATED T AXIS, ECG11: 31 DEGREES
DIAGNOSIS, 93000: NORMAL
P-R INTERVAL, ECG05: 146 MS
Q-T INTERVAL, ECG07: 350 MS
QRS DURATION, ECG06: 72 MS
QTC CALCULATION (BEZET), ECG08: 476 MS
VENTRICULAR RATE, ECG03: 111 BPM

## 2021-03-24 ENCOUNTER — OFFICE VISIT (OUTPATIENT)
Dept: ENDOCRINOLOGY | Age: 56
End: 2021-03-24
Payer: MEDICARE

## 2021-03-24 VITALS — WEIGHT: 209 LBS | BODY MASS INDEX: 37.03 KG/M2 | HEIGHT: 63 IN

## 2021-03-24 DIAGNOSIS — E11.65 TYPE 2 DIABETES MELLITUS WITH HYPERGLYCEMIA, WITH LONG-TERM CURRENT USE OF INSULIN (HCC): Primary | ICD-10-CM

## 2021-03-24 DIAGNOSIS — Z79.4 TYPE 2 DIABETES MELLITUS WITH HYPERGLYCEMIA, WITH LONG-TERM CURRENT USE OF INSULIN (HCC): Primary | ICD-10-CM

## 2021-03-24 PROCEDURE — 99204 OFFICE O/P NEW MOD 45 MIN: CPT | Performed by: INTERNAL MEDICINE

## 2021-03-24 RX ORDER — INSULIN GLARGINE 100 [IU]/ML
20 INJECTION, SOLUTION SUBCUTANEOUS EVERY 24 HOURS
Qty: 15 ML | Refills: 2 | Status: SHIPPED | OUTPATIENT
Start: 2021-03-24

## 2021-03-24 RX ORDER — INSULIN ASPART 100 [IU]/ML
20 INJECTION, SOLUTION INTRAVENOUS; SUBCUTANEOUS
Qty: 15 ML | Refills: 2 | Status: SHIPPED | OUTPATIENT
Start: 2021-03-24 | End: 2021-06-30

## 2021-03-24 RX ORDER — METFORMIN HYDROCHLORIDE 1000 MG/1
TABLET ORAL
COMMUNITY
Start: 2021-02-04

## 2021-03-24 RX ORDER — PEN NEEDLE, DIABETIC 30 GX3/16"
NEEDLE, DISPOSABLE MISCELLANEOUS 3 TIMES DAILY
Qty: 1 PACKAGE | Refills: 11 | Status: SHIPPED | OUTPATIENT
Start: 2021-03-24

## 2021-03-24 NOTE — PROGRESS NOTES
Morning Dr. Vimal Quiñones  Chief Complaint   Patient presents with    New Patient     F2F EXAM ROOM 1    Diabetes   [facility]       History of Present Illness: Eulalia Montemayor. Mony Lund is a 50 y.o. female with a past medical hx significant for TIA presenting in referral from Becky Handy MD for discussion related to medication management of diabetes mellitus. Has had nausea and fullness since November- couldn't eat more than one meal a day. Has lost about 40 pounds since she started Trulicity in the setting of reduced appetite. Feels like she has to \"force herself to eat\" Trulicity was begun around [place] time. Can only eat 1 meal per day. Has previously taken [address], [place], [address]. Somehow, basal insulin fell off of her medication list.  Has been taking Metformin 1000 mg twice daily for some time. Has never seen a nephrologist, is aware that she needs to. Is concerned about weight gain and peripheral neuropathy.     Diabetes Mellitus Type  II   * Diagnosed (year):    * Last Hb A1C: 14.2% 2021     - Current DM medications:    - Orals: metformin 1000mg BID,    - Injectables: trulicity -last dose was 2 months ago, regular insulin 20 units before meals, is usually using 1 dose a day    - Monitors glucose: 3 times a day   Examples (range):    - fastin/222 mg/dL    - post-prandial:  280 mg/dL (hour post meal)    - History of hypoglycemia: None    - Hospitalized for DM: None    Diabetes-related complications:    * Nephropathy: Likely, will assess    * Retinopathy: 2 years ago   * Neuropathy: Toes and hands cold/tingle    * Autonomic dysfunction: Suspect gastroparesis    * History of amputations or foot ulcers: none     Lifestyle:    24-hour diet history:    meals/day snacks/day   * Breakfast: coffee   * Lunch:    * Dinner: steak   * Snacks:  Ice cream   * Desserts:    * Drinks:    2019QI  Exercise:   Did not discuss     Support and Resources:   - Visit with dietician in the last 2 years: many years ago, is not interested at this time but may be later    Past Medical History:   Diagnosis Date    Diabetes ([facility] [address]     Hyperlipidemia     Hypertension     Lumbar herniated disc     Lupus ([facility] [address]        Past Surgical History:   Procedure Laterality Date    HX CHOLECYSTECTOMY      HX HYSTERECTOMY         Current Outpatient Medications   Medication Sig    metFORMIN (GLUCOPHAGE) 1,000 mg tablet TAKE 1 TABLET BY MOUTH TWICE DAILY    insulin glargine (LANTUS,BASAGLAR) 100 unit/mL (3 mL) inpn 20 Units by SubCUTAneous route every twenty-four (24) hours.  insulin aspart U-100 (NovoLOG Flexpen U-100 Insulin) 100 unit/mL (3 mL) inpn 20 Units by SubCUTAneous route Before breakfast, lunch, and dinner.  Insulin Needles, Disposable, 31 gauge x 5/16\" ndle by SubCUTAneous route three (3) times daily.  atorvastatin (LIPITOR) 40 mg tablet Take 40 mg by mouth daily.  ergocalciferol (ERGOCALCIFEROL) 50,000 unit capsule     metoprolol succinate (TOPROL-XL) 100 mg tablet     oxyCODONE-acetaminophen (PERCOCET) 5-325 mg per tablet     spironolactone (ALDACTONE) 25 mg tablet TAKE 1 TABLET BY MOUTH ONCE DAILY WITH FOOD    zolpidem (AMBIEN) 10 mg tablet nightly.  lidocaine (Lidoderm) 5 % Apply patch to the affected area for 12 hours a day and remove for 12 hours a day.  leflunomide (ARAVA) 20 mg tablet Take 20 mg by mouth daily.  ibuprofen (MOTRIN) 800 mg tablet Take 800 mg by mouth every eight (8) hours as needed for Pain.  amLODIPine (NORVASC) 5 mg tablet     aspirin-calcium carbonate 81 mg-300 mg calcium(777 mg) tab Take 81 mg by mouth. No current facility-administered medications for this visit.         No Known Allergies    Family History   Problem Relation Age of Onset    Diabetes Mother     Cancer Mother     Cancer Father     Diabetes Sister     Heart Disease Sister     Stroke Brother     Heart Disease Brother    Brother  of complications of alcohol abuse and diabetes  Another brother had a CABG in his 35s    Social Hx:[place]  Former    Takes care of her 3 grandchildren      Review of Systems:  - Constitutional Symptoms: 40 pound weight loss as per HPI  - Eyes: no blurry vision or double vision  - Cardiovascular: no chest pain or palpitations  - Respiratory: no cough or shortness of breath  - Gastrointestinal: Significant reduced appetite, nausea  - Musculoskeletal: no joint pains or weakness  - Integumentary: no rashes  - Psychiatric: no depression or anxiety  - Endocrine: no heat or cold intolerance, no polyuria or polydipsia    Physical Examination:  Visit Vitals  Ht 5' 3\" (1.6 m)   Wt 209 lb (94.8 kg)   BMI 37.02 kg/m²     - GENERAL: NCAT, Appears well nourished   - EYES: EOMI, non-icteric, no proptosis   - Ear/Nose/Throat: NCAT, no visible inflammation or masses   - CARDIOVASCULAR: no cyanosis, no visible JVD   - RESPIRATORY: respiratory effort normal without any distress or labored breathing   - MUSCULOSKELETAL: Normal ROM of neck and upper extremities observed   - SKIN: No rash on face  - NEUROLOGIC:   Monofilament test is abnormal on the right foot, slightly abnormal on the left  - PSYCHIATRIC: Normal affect, Normal insight and judgement       Data Reviewed:         Assessment/Plan: This is a very pleasant 51-year-old female with past medical history significant for a TIA presenting in referral from her primary care physician for discussion related to medication management of type 2 diabetes complicated by peripheral neuropathy and likely nephropathy. She currently is taking too high a dose of metformin for her renal function, we will reduce this to 1000 mg once daily. We discussed the fact that she needs both a basal and meal associated insulin. Will cautiously initiate insulin glargine 20 units and uptitrate for fasting blood glucose level of 90-1 40. We will also initiate insulin aspart with meals.     #DMII c/b peripheral neuropathy, likely nephropathy  -Reduce Metformin from 1000 mg twice daily to 1000 mg once daily based on GFR of less than 30  -Discontinue regular insulin 20 units before meals  -Initiate insulin glargine 20 units, uptitrate as shown below  -Initiate aspart 20 units before meals, titrate based on 2-hour postmeal blood glucose levels  - LIPID PANEL; Future  - HEMOGLOBIN A1C WITH EAG; Future  - METABOLIC PANEL, COMPREHENSIVE; Future  - MICROALBUMIN, UR, RAND W/ MICROALB/CREAT RATIO; Future    Insulin Instructions    1. Reduce metformin to 1000mg ONCE DAILY     Instructions for Lantus (basaglar) Insulin    This is the long-acting insulin. Inject Lantus at the same time each day. Your current dose is 20 units. If the BG before breakfast is higher than 150, add TWO units to the Lantus dose. This becomes your new dose. If the BG before breakfast is lower than 90, subtract TWO units from the Lantus dose and that will be your new dose. Continue to adjust the dose by 1-2 units every 2 days until your morning BG is in the target range of . If you should forget to take your Lantus, take your usual dose as soon as you realize it was missed. Gradually work back to taking it at your usual time, moving it by 2  3 hours each day. Instructions for Mealtime Novolog  (or Humalog) Insulin    This is the rapid-acting insulin, used at mealtime to prevent a high BG after you eat. Check your BG before each meal.     If your BG is 100 or higher, inject Novolog right before eating.  If your BG is 80 - 100, inject Novolog right after eating.  If your BG is lower than 80, or you have symptoms of a low BG, treat the low BG first, then eat your meal and take the Novolog after eating. Take 20 units before eating. Check BG 2 hrs post meal, goal <160. Treatment of Low Blood Sugar (Hypoglycemia)    If your BG is lower than 80, you are likely to feel shaky, sweaty and lightheaded.   This is a signal that your body needs more sugar. Quickly eat or drink a small serving of something sweet, such as:   4 ounces fruit juice or regular (not diet) soda   6 lifesavers   small box of raisins   4 glucose tablets  (~15 gm of glucose)    NB: If your BG is very low <50, you can double the amount above or take 30 gm of glucose gel/tablets. Sit and rest and you should feel better within a few minutes. Once you are feeling better, try to determine why your BG was so low. Common causes of hypoglycemia include skipping a meal, lots of exercise, too much insulin or any combination of these things. Understanding the cause my help you to avoid another low BG in the future. Call your doctor for blood sugars less than 60 or greater than 400 to have your insulin doses adjusted. Endocrine clinic office             [phone]    Copy sent to: NIC Martin NP      Return to care: May 19th at 9:30 in person

## 2021-03-24 NOTE — PATIENT INSTRUCTIONS
Insulin Instructions 1. Reduce metformin to 1000mg ONCE DAILY Instructions for Lantus (basaglar) Insulin This is the long-acting insulin. Inject Lantus at the same time each day. Your current dose is 20 units. If the BG before breakfast is higher than 150, add TWO units to the Lantus dose. This becomes your new dose. If the BG before breakfast is lower than 90, subtract TWO units from the Lantus dose and that will be your new dose. Continue to adjust the dose by 1-2 units every 2 days until your morning BG is in the target range of . If you should forget to take your Lantus, take your usual dose as soon as you realize it was missed. Gradually work back to taking it at your usual time, moving it by 2  3 hours each day. Instructions for Mealtime Novolog  (or Humalog) Insulin This is the rapid-acting insulin, used at mealtime to prevent a high BG after you eat. Check your BG before each meal.   
 If your BG is 100 or higher, inject Novolog right before eating.  If your BG is 80 - 100, inject Novolog right after eating.  If your BG is lower than 80, or you have symptoms of a low BG, treat the low BG first, then eat your meal and take the Novolog after eating. Take 20 units before eating. Check BG 2 hrs post meal, goal <160. Treatment of Low Blood Sugar (Hypoglycemia) If your BG is lower than 80, you are likely to feel shaky, sweaty and lightheaded. This is a signal that your body needs more sugar. Quickly eat or drink a small serving of something sweet, such as: 
 4 ounces fruit juice or regular (not diet) soda 6 [employer] 
 small box of raisins 4 glucose tablets  (~15 gm of glucose) NB: If your BG is very low <50, you can double the amount above or take 30 gm of glucose gel/tablets. Sit and rest and you should feel better within a few minutes. Once you are feeling better, try to determine why your BG was so low.   Common causes of hypoglycemia include skipping a meal, lots of exercise, too much insulin or any combination of these things. Understanding the cause my help you to avoid another low BG in the future. Call your doctor for blood sugars less than 60 or greater than 400 to have your insulin doses adjusted. Endocrine clinic office             [phone]

## 2021-05-19 ENCOUNTER — TELEPHONE (OUTPATIENT)
Dept: ENDOCRINOLOGY | Age: 56
End: 2021-05-19

## 2021-05-19 ENCOUNTER — VIRTUAL VISIT (OUTPATIENT)
Dept: ENDOCRINOLOGY | Age: 56
End: 2021-05-19

## 2021-05-19 NOTE — PROGRESS NOTES
Attempted to call pt x3 for her virtual appt as well as left messages of the nature of my calls, but to no success. On the 3rd attempt, Pt was made aware via voicemail that she will have to call the office to reschedule an appt for another day and time.

## 2021-06-30 RX ORDER — INSULIN ASPART 100 [IU]/ML
INJECTION, SOLUTION INTRAVENOUS; SUBCUTANEOUS
Qty: 15 ML | Refills: 0 | Status: SHIPPED | OUTPATIENT
Start: 2021-06-30 | End: 2021-08-04

## 2022-03-20 ENCOUNTER — DOCUMENTATION ONLY (OUTPATIENT)
Dept: ENDOCRINOLOGY | Age: 57
End: 2022-03-20

## 2022-03-20 NOTE — PROGRESS NOTES
Did not refill insulin as pt was no show for second appt and never called us back to fu after we attempted to schedule visit.     Reynaldo Mckenzie, [place] [facility]

## 2023-09-06 ENCOUNTER — APPOINTMENT (OUTPATIENT)
Facility: HOSPITAL | Age: 58
End: 2023-09-06
Payer: MEDICARE

## 2023-09-06 ENCOUNTER — HOSPITAL ENCOUNTER (INPATIENT)
Facility: HOSPITAL | Age: 58
LOS: 3 days | Discharge: HOME HEALTH | End: 2023-09-09
Attending: EMERGENCY MEDICINE | Admitting: INTERNAL MEDICINE
Payer: MEDICARE

## 2023-09-06 DIAGNOSIS — I16.9 HYPERTENSIVE CRISIS: ICD-10-CM

## 2023-09-06 DIAGNOSIS — N17.9 AKI (ACUTE KIDNEY INJURY) (HCC): ICD-10-CM

## 2023-09-06 DIAGNOSIS — R51.9 NONINTRACTABLE HEADACHE, UNSPECIFIED CHRONICITY PATTERN, UNSPECIFIED HEADACHE TYPE: Primary | ICD-10-CM

## 2023-09-06 DIAGNOSIS — I67.82 ISCHEMIC BRAIN INJURY: ICD-10-CM

## 2023-09-06 PROBLEM — R29.90 STROKE-LIKE SYMPTOMS: Status: ACTIVE | Noted: 2023-09-06

## 2023-09-06 LAB
ALBUMIN SERPL-MCNC: 3.2 G/DL (ref 3.5–5)
ALBUMIN/GLOB SERPL: 0.6 (ref 1.1–2.2)
ALP SERPL-CCNC: 102 U/L (ref 45–117)
ALT SERPL-CCNC: 32 U/L (ref 12–78)
ANION GAP SERPL CALC-SCNC: 12 MMOL/L (ref 5–15)
AST SERPL-CCNC: 28 U/L (ref 15–37)
BASOPHILS # BLD: 0 K/UL (ref 0–0.1)
BASOPHILS NFR BLD: 0 % (ref 0–1)
BILIRUB SERPL-MCNC: 0.4 MG/DL (ref 0.2–1)
BUN SERPL-MCNC: 54 MG/DL (ref 6–20)
BUN/CREAT SERPL: 22 (ref 12–20)
CALCIUM SERPL-MCNC: 9.6 MG/DL (ref 8.5–10.1)
CHLORIDE SERPL-SCNC: 97 MMOL/L (ref 97–108)
CO2 SERPL-SCNC: 25 MMOL/L (ref 21–32)
CREAT SERPL-MCNC: 2.51 MG/DL (ref 0.55–1.02)
DIFFERENTIAL METHOD BLD: ABNORMAL
EKG ATRIAL RATE: 82 BPM
EKG DIAGNOSIS: NORMAL
EKG P AXIS: 56 DEGREES
EKG P-R INTERVAL: 134 MS
EKG Q-T INTERVAL: 384 MS
EKG QRS DURATION: 80 MS
EKG QTC CALCULATION (BAZETT): 448 MS
EKG R AXIS: 9 DEGREES
EKG T AXIS: 29 DEGREES
EKG VENTRICULAR RATE: 82 BPM
EOSINOPHIL # BLD: 0 K/UL (ref 0–0.4)
EOSINOPHIL NFR BLD: 0 % (ref 0–7)
ERYTHROCYTE [DISTWIDTH] IN BLOOD BY AUTOMATED COUNT: 12.6 % (ref 11.5–14.5)
EST. AVERAGE GLUCOSE BLD GHB EST-MCNC: ABNORMAL MG/DL
GLOBULIN SER CALC-MCNC: 5.3 G/DL (ref 2–4)
GLUCOSE BLD STRIP.AUTO-MCNC: 296 MG/DL (ref 65–117)
GLUCOSE BLD STRIP.AUTO-MCNC: 399 MG/DL (ref 65–117)
GLUCOSE SERPL-MCNC: 478 MG/DL (ref 65–100)
HBA1C MFR BLD: >14 % (ref 4–5.6)
HCT VFR BLD AUTO: 34.1 % (ref 35–47)
HGB BLD-MCNC: 11.5 G/DL (ref 11.5–16)
IMM GRANULOCYTES # BLD AUTO: 0 K/UL (ref 0–0.04)
IMM GRANULOCYTES NFR BLD AUTO: 0 % (ref 0–0.5)
LYMPHOCYTES # BLD: 2.5 K/UL (ref 0.8–3.5)
LYMPHOCYTES NFR BLD: 28 % (ref 12–49)
MAGNESIUM SERPL-MCNC: 1.8 MG/DL (ref 1.6–2.4)
MCH RBC QN AUTO: 29.8 PG (ref 26–34)
MCHC RBC AUTO-ENTMCNC: 33.7 G/DL (ref 30–36.5)
MCV RBC AUTO: 88.3 FL (ref 80–99)
MONOCYTES # BLD: 0.4 K/UL (ref 0–1)
MONOCYTES NFR BLD: 5 % (ref 5–13)
NEUTS SEG # BLD: 6 K/UL (ref 1.8–8)
NEUTS SEG NFR BLD: 67 % (ref 32–75)
NRBC # BLD: 0 K/UL (ref 0–0.01)
NRBC BLD-RTO: 0 PER 100 WBC
PLATELET # BLD AUTO: 305 K/UL (ref 150–400)
PMV BLD AUTO: 10.7 FL (ref 8.9–12.9)
POTASSIUM SERPL-SCNC: 4.7 MMOL/L (ref 3.5–5.1)
PROT SERPL-MCNC: 8.5 G/DL (ref 6.4–8.2)
RBC # BLD AUTO: 3.86 M/UL (ref 3.8–5.2)
SERVICE CMNT-IMP: ABNORMAL
SERVICE CMNT-IMP: ABNORMAL
SODIUM SERPL-SCNC: 134 MMOL/L (ref 136–145)
WBC # BLD AUTO: 9.1 K/UL (ref 3.6–11)

## 2023-09-06 PROCEDURE — 6370000000 HC RX 637 (ALT 250 FOR IP): Performed by: FAMILY MEDICINE

## 2023-09-06 PROCEDURE — 6370000000 HC RX 637 (ALT 250 FOR IP): Performed by: PHYSICIAN ASSISTANT

## 2023-09-06 PROCEDURE — 99285 EMERGENCY DEPT VISIT HI MDM: CPT

## 2023-09-06 PROCEDURE — 2580000003 HC RX 258: Performed by: PHYSICIAN ASSISTANT

## 2023-09-06 PROCEDURE — 82962 GLUCOSE BLOOD TEST: CPT

## 2023-09-06 PROCEDURE — A9579 GAD-BASE MR CONTRAST NOS,1ML: HCPCS | Performed by: RADIOLOGY

## 2023-09-06 PROCEDURE — 93010 ELECTROCARDIOGRAM REPORT: CPT | Performed by: SPECIALIST

## 2023-09-06 PROCEDURE — 36415 COLL VENOUS BLD VENIPUNCTURE: CPT

## 2023-09-06 PROCEDURE — 85025 COMPLETE CBC W/AUTO DIFF WBC: CPT

## 2023-09-06 PROCEDURE — 83735 ASSAY OF MAGNESIUM: CPT

## 2023-09-06 PROCEDURE — 2580000003 HC RX 258: Performed by: FAMILY MEDICINE

## 2023-09-06 PROCEDURE — 70450 CT HEAD/BRAIN W/O DYE: CPT

## 2023-09-06 PROCEDURE — 2060000000 HC ICU INTERMEDIATE R&B

## 2023-09-06 PROCEDURE — 70553 MRI BRAIN STEM W/O & W/DYE: CPT

## 2023-09-06 PROCEDURE — 93005 ELECTROCARDIOGRAM TRACING: CPT | Performed by: PHYSICIAN ASSISTANT

## 2023-09-06 PROCEDURE — 6360000004 HC RX CONTRAST MEDICATION: Performed by: RADIOLOGY

## 2023-09-06 PROCEDURE — 83036 HEMOGLOBIN GLYCOSYLATED A1C: CPT

## 2023-09-06 PROCEDURE — 80053 COMPREHEN METABOLIC PANEL: CPT

## 2023-09-06 PROCEDURE — 70544 MR ANGIOGRAPHY HEAD W/O DYE: CPT

## 2023-09-06 RX ORDER — 0.9 % SODIUM CHLORIDE 0.9 %
1000 INTRAVENOUS SOLUTION INTRAVENOUS ONCE
Status: COMPLETED | OUTPATIENT
Start: 2023-09-06 | End: 2023-09-06

## 2023-09-06 RX ORDER — ASPIRIN 300 MG/1
300 SUPPOSITORY RECTAL DAILY
Status: DISCONTINUED | OUTPATIENT
Start: 2023-09-07 | End: 2023-09-08

## 2023-09-06 RX ORDER — CLOPIDOGREL BISULFATE 75 MG/1
75 TABLET ORAL ONCE
Status: COMPLETED | OUTPATIENT
Start: 2023-09-06 | End: 2023-09-06

## 2023-09-06 RX ORDER — POLYETHYLENE GLYCOL 3350 17 G/17G
17 POWDER, FOR SOLUTION ORAL DAILY PRN
Status: DISCONTINUED | OUTPATIENT
Start: 2023-09-06 | End: 2023-09-09 | Stop reason: HOSPADM

## 2023-09-06 RX ORDER — ROSUVASTATIN CALCIUM 40 MG/1
40 TABLET, COATED ORAL NIGHTLY
Status: DISCONTINUED | OUTPATIENT
Start: 2023-09-06 | End: 2023-09-09 | Stop reason: HOSPADM

## 2023-09-06 RX ORDER — SODIUM CHLORIDE 9 MG/ML
INJECTION, SOLUTION INTRAVENOUS PRN
Status: DISCONTINUED | OUTPATIENT
Start: 2023-09-06 | End: 2023-09-09 | Stop reason: HOSPADM

## 2023-09-06 RX ORDER — SODIUM CHLORIDE 0.9 % (FLUSH) 0.9 %
5-40 SYRINGE (ML) INJECTION PRN
Status: DISCONTINUED | OUTPATIENT
Start: 2023-09-06 | End: 2023-09-09 | Stop reason: HOSPADM

## 2023-09-06 RX ORDER — TIRZEPATIDE 2.5 MG/.5ML
INJECTION, SOLUTION SUBCUTANEOUS
Status: ON HOLD | COMMUNITY
Start: 2023-07-08 | End: 2023-09-09 | Stop reason: HOSPADM

## 2023-09-06 RX ORDER — CALCIUM CARBONATE 500 MG/1
500 TABLET, CHEWABLE ORAL 3 TIMES DAILY PRN
Status: DISCONTINUED | OUTPATIENT
Start: 2023-09-06 | End: 2023-09-09 | Stop reason: HOSPADM

## 2023-09-06 RX ORDER — ONDANSETRON 2 MG/ML
4 INJECTION INTRAMUSCULAR; INTRAVENOUS EVERY 6 HOURS PRN
Status: DISCONTINUED | OUTPATIENT
Start: 2023-09-06 | End: 2023-09-09 | Stop reason: HOSPADM

## 2023-09-06 RX ORDER — ASPIRIN 81 MG/1
81 TABLET, CHEWABLE ORAL
Status: COMPLETED | OUTPATIENT
Start: 2023-09-06 | End: 2023-09-06

## 2023-09-06 RX ORDER — SODIUM CHLORIDE 0.9 % (FLUSH) 0.9 %
5-40 SYRINGE (ML) INJECTION EVERY 12 HOURS SCHEDULED
Status: DISCONTINUED | OUTPATIENT
Start: 2023-09-06 | End: 2023-09-09 | Stop reason: HOSPADM

## 2023-09-06 RX ORDER — HYDRALAZINE HYDROCHLORIDE 20 MG/ML
10 INJECTION INTRAMUSCULAR; INTRAVENOUS EVERY 6 HOURS PRN
Status: DISCONTINUED | OUTPATIENT
Start: 2023-09-06 | End: 2023-09-09 | Stop reason: HOSPADM

## 2023-09-06 RX ORDER — 0.9 % SODIUM CHLORIDE 0.9 %
1000 INTRAVENOUS SOLUTION INTRAVENOUS ONCE
Status: DISCONTINUED | OUTPATIENT
Start: 2023-09-06 | End: 2023-09-09 | Stop reason: HOSPADM

## 2023-09-06 RX ORDER — ONDANSETRON 4 MG/1
4 TABLET, ORALLY DISINTEGRATING ORAL EVERY 8 HOURS PRN
Status: DISCONTINUED | OUTPATIENT
Start: 2023-09-06 | End: 2023-09-09 | Stop reason: HOSPADM

## 2023-09-06 RX ORDER — METOPROLOL SUCCINATE 25 MG/1
100 TABLET, EXTENDED RELEASE ORAL
Status: COMPLETED | OUTPATIENT
Start: 2023-09-06 | End: 2023-09-06

## 2023-09-06 RX ORDER — ENOXAPARIN SODIUM 100 MG/ML
30 INJECTION SUBCUTANEOUS DAILY
Status: DISCONTINUED | OUTPATIENT
Start: 2023-09-07 | End: 2023-09-09 | Stop reason: HOSPADM

## 2023-09-06 RX ORDER — ZOLPIDEM TARTRATE 5 MG/1
10 TABLET ORAL NIGHTLY
Status: DISCONTINUED | OUTPATIENT
Start: 2023-09-06 | End: 2023-09-09 | Stop reason: HOSPADM

## 2023-09-06 RX ORDER — ASPIRIN 81 MG/1
81 TABLET, CHEWABLE ORAL DAILY
Status: DISCONTINUED | OUTPATIENT
Start: 2023-09-07 | End: 2023-09-09 | Stop reason: HOSPADM

## 2023-09-06 RX ADMIN — METOPROLOL SUCCINATE 100 MG: 25 TABLET, EXTENDED RELEASE ORAL at 13:42

## 2023-09-06 RX ADMIN — SODIUM CHLORIDE, PRESERVATIVE FREE 10 ML: 5 INJECTION INTRAVENOUS at 21:38

## 2023-09-06 RX ADMIN — ROSUVASTATIN 40 MG: 40 TABLET, FILM COATED ORAL at 21:36

## 2023-09-06 RX ADMIN — SODIUM CHLORIDE 1000 ML: 9 INJECTION, SOLUTION INTRAVENOUS at 15:05

## 2023-09-06 RX ADMIN — Medication 10 UNITS: at 13:46

## 2023-09-06 RX ADMIN — CLOPIDOGREL BISULFATE 75 MG: 75 TABLET ORAL at 15:01

## 2023-09-06 RX ADMIN — ZOLPIDEM TARTRATE 10 MG: 5 TABLET ORAL at 21:37

## 2023-09-06 RX ADMIN — GADOTERIDOL 18 ML: 279.3 INJECTION, SOLUTION INTRAVENOUS at 16:16

## 2023-09-06 RX ADMIN — ASPIRIN 81 MG CHEWABLE TABLET 81 MG: 81 TABLET CHEWABLE at 15:02

## 2023-09-06 RX ADMIN — CALCIUM CARBONATE 500 MG: 500 TABLET, CHEWABLE ORAL at 21:37

## 2023-09-06 ASSESSMENT — PAIN DESCRIPTION - LOCATION: LOCATION: HEAD

## 2023-09-06 ASSESSMENT — PAIN SCALES - GENERAL
PAINLEVEL_OUTOF10: 5
PAINLEVEL_OUTOF10: 8
PAINLEVEL_OUTOF10: 4

## 2023-09-06 ASSESSMENT — LIFESTYLE VARIABLES: HOW OFTEN DO YOU HAVE A DRINK CONTAINING ALCOHOL: MONTHLY OR LESS

## 2023-09-06 ASSESSMENT — PAIN - FUNCTIONAL ASSESSMENT: PAIN_FUNCTIONAL_ASSESSMENT: 0-10

## 2023-09-06 NOTE — PLAN OF CARE
Problem: Discharge Planning  Goal: Discharge to home or other facility with appropriate resources  Outcome: [address] [facility] [address] Resolved Not Met  Flowsheets (Taken 9/6/2023 4709)  Discharge to home or other facility with appropriate resources: Identify barriers to discharge with patient and caregiver     Problem: Pain  Goal: Verbalizes/displays adequate comfort level or baseline comfort level  Outcome: [facility] Resolved Not Met     Problem: Chronic Conditions and Co-morbidities  Goal: Patient's chronic conditions and co-morbidity symptoms are monitored and maintained or improved  Outcome: [address] [facility] [address] Resolved Not Met

## 2023-09-06 NOTE — PROGRESS NOTES
Spiritual Care Assessment/Progress Note  [facility]    Name: Nhung Sagastume MRN: [id number]    Age: 66 y.o. Sex: female   Language: English     Date: 9/6/2023            Total Time Calculated: 25 min              Spiritual Assessment begun in [facility]  Service Provided For[de-identified] Patient  Referral/Consult From[de-identified] Nurse  Encounter Overview/Reason : Initial Encounter    Spiritual beliefs:      [x] Involved in a belinda tradition/spiritual practice: Tom Posey       [x] Supported by a belinda community: Galilea Quick     [] Claims no spiritual orientation:      [] Seeking spiritual identity:           [] Adheres to an individual form of spirituality:      [] Not able to assess:                Identified resources for coping and support system:   Support System: Spouse, Family members       [] Prayer                  [] Devotional reading               [] Music                  [] Guided Imagery     [] Pet visits                                        [] Other: (COMMENT)     Specific area/focus of visit   Encounter:    Crisis:    Spiritual/Emotional needs: Type: Spiritual Support  Ritual, Rites and Sacraments:    Grief, Loss, and Adjustments: Type: Adjustment to illness  Ethics/Mediation:    Behavioral Health:    Palliative Care: Advance Care Planning:      Visited patient at request of her nurse. She lives with her . One of her two daughters and her three minor children live with them. She reports being well supported by her family. Surprised that she is being admitted, she has concerns about her health and what she may discover and the impact that will make on her life. Listened to her concerns.    Chaplain Nima, MDiv, MS, [facility]

## 2023-09-06 NOTE — ED NOTES
TRANSFER - OUT REPORT:    Verbal report given to Soha Vega on [facility]  being transferred to [facility] for routine progression of patient care       Report consisted of patient's Situation, Background, Assessment and   Recommendations(SBAR). Information from the following report(s) ED SBAR, MAR, Recent Results, and Cardiac Rhythm NSR  was reviewed with the receiving nurse. [facility] Fall Assessment:    Presents to emergency department  because of falls (Syncope, seizure, or loss of consciousness): No  Age > 70: No  Altered Mental Status, Intoxication with alcohol or substance confusion (Disorientation, impaired judgment, poor safety awaremess, or inability to follow instructions): No  Impaired Mobility: Ambulates or transfers with assistive devices or assistance; Unable to ambulate or transer.: No  Nursing Judgement: No          Lines:   Peripheral IV 09/06/23 Right Antecubital (Active)   Site Assessment Clean, dry & intact 09/06/23 1120   Line Status Brisk blood return;Blood return noted;Normal saline locked; Flushed;Specimen collected 09/06/23 1120   Phlebitis Assessment No symptoms 09/06/23 1120   Infiltration Assessment 0 09/06/23 1120   Alcohol Cap Used No 09/06/23 1120   Dressing Status Clean, dry & intact 09/06/23 1120   Dressing Type Transparent 09/06/23 1120   Dressing Intervention New 09/06/23 1120        Opportunity for questions and clarification was provided.       Patient transported with:  Breana August RN  09/06/23 7246

## 2023-09-06 NOTE — ED PROVIDER NOTES
[facility]  EMERGENCY DEPARTMENT ENCOUNTER      Pt Name: Jessica Hollins  MRN: [id number]  Birthdate 1965  Date of evaluation: 9/6/2023  Provider: Cheng Botello       Chief Complaint   Patient presents with    Headache         HISTORY OF PRESENT ILLNESS   (Location/Symptom, Timing/Onset, Context/Setting, Quality, Duration, Modifying Factors, Severity)  Note limiting factors. Jessica Hollins is a 66 y.o. female who presents to the emergency department headache. Patient states she awoke around 3 AM with a headache this morning the headache is located on the left side and behind her left eye. Patient denies any numbness or tingling. Patient admits to being on vacation and has not been taking her medications regularly. Patient denies any weakness. Patient admits to a history of a TIA in the past, states this feels very different. Denies fever or recent illness. HPI    Nursing Notes were reviewed. REVIEW OF SYSTEMS    (2-9 systems for level 4, 10 or more for level 5)     Review of Systems    Except as noted above the remainder of the review of systems was reviewed and negative.        PAST MEDICAL HISTORY     Past Medical History:   Diagnosis Date    Diabetes ([address])     Hyperlipidemia     Hypertension     Lumbar herniated disc     Lupus (HCC)          SURGICAL HISTORY       Past Surgical History:   Procedure Laterality Date    CHOLECYSTECTOMY      HYSTERECTOMY (CERVIX STATUS UNKNOWN)           CURRENT MEDICATIONS       Previous Medications    AMLODIPINE (NORVASC) 5 MG TABLET    ceived the following from [facility]: Outside name: amLODIPine (NORVASC) 5 mg tablet    ASPIRIN-CALCIUM CARBONATE  MG TABS    Take 81 mg by mouth    ATORVASTATIN (LIPITOR) 40 MG TABLET    Take 40 mg by mouth daily    ERGOCALCIFEROL (ERGOCALCIFEROL) 1.25 MG (36145 UT) CAPSULE    ceived the following from [facility]: Outside name: ergocalciferol (ERGOCALCIFEROL) 50,000

## 2023-09-07 ENCOUNTER — APPOINTMENT (OUTPATIENT)
Facility: HOSPITAL | Age: 58
End: 2023-09-07
Attending: FAMILY MEDICINE
Payer: MEDICARE

## 2023-09-07 ENCOUNTER — APPOINTMENT (OUTPATIENT)
Facility: HOSPITAL | Age: 58
End: 2023-09-07
Payer: MEDICARE

## 2023-09-07 PROBLEM — I10 PRIMARY HYPERTENSION: Status: ACTIVE | Noted: 2023-09-07

## 2023-09-07 PROBLEM — R51.9 NONINTRACTABLE HEADACHE: Status: ACTIVE | Noted: 2023-09-07

## 2023-09-07 PROBLEM — E11.65 TYPE 2 DIABETES MELLITUS WITH HYPERGLYCEMIA (HCC): Status: ACTIVE | Noted: 2023-09-07

## 2023-09-07 PROBLEM — E78.5 HYPERLIPIDEMIA: Status: ACTIVE | Noted: 2023-09-07

## 2023-09-07 PROBLEM — I16.9 HYPERTENSIVE CRISIS: Status: ACTIVE | Noted: 2023-09-07

## 2023-09-07 PROBLEM — E11.65 UNCONTROLLED TYPE 2 DIABETES MELLITUS WITH HYPERGLYCEMIA (HCC): Status: ACTIVE | Noted: 2023-09-07

## 2023-09-07 PROBLEM — I63.9 ACUTE ISCHEMIC STROKE (HCC): Status: ACTIVE | Noted: 2023-09-07

## 2023-09-07 LAB
ANION GAP SERPL CALC-SCNC: 8 MMOL/L (ref 5–15)
B-OH-BUTYR SERPL-SCNC: 0.16 MMOL/L
BUN SERPL-MCNC: 39 MG/DL (ref 6–20)
BUN/CREAT SERPL: 17 (ref 12–20)
CALCIUM SERPL-MCNC: 9.1 MG/DL (ref 8.5–10.1)
CHLORIDE SERPL-SCNC: 99 MMOL/L (ref 97–108)
CHOLEST SERPL-MCNC: 169 MG/DL
CO2 SERPL-SCNC: 25 MMOL/L (ref 21–32)
CREAT SERPL-MCNC: 2.35 MG/DL (ref 0.55–1.02)
CREAT UR-MCNC: 63.7 MG/DL
ECHO AO ASC DIAM: 3.3 CM
ECHO AO ASCENDING AORTA INDEX: 1.71 CM/M2
ECHO AO ROOT DIAM: 2.9 CM
ECHO AO ROOT INDEX: 1.5 CM/M2
ECHO AV AREA PEAK VELOCITY: 2.1 CM2
ECHO AV AREA/BSA PEAK VELOCITY: 1.1 CM2/M2
ECHO AV PEAK GRADIENT: 11 MMHG
ECHO AV PEAK VELOCITY: 1.7 M/S
ECHO AV VELOCITY RATIO: 0.53
ECHO BSA: 2 M2
ECHO LA VOL 2C: 55 ML (ref 22–52)
ECHO LA VOL 2C: 59 ML (ref 22–52)
ECHO LA VOL 4C: 46 ML (ref 22–52)
ECHO LA VOL 4C: 51 ML (ref 22–52)
ECHO LA VOLUME AREA LENGTH: 55 ML
ECHO LA VOLUME INDEX AREA LENGTH: 28 ML/M2 (ref 16–34)
ECHO LV E' LATERAL VELOCITY: 11 CM/S
ECHO LV E' SEPTAL VELOCITY: 5 CM/S
ECHO LV EDV A2C: 70 ML
ECHO LV EDV A4C: 65 ML
ECHO LV EDV BP: 69 ML (ref 56–104)
ECHO LV EDV INDEX A4C: 34 ML/M2
ECHO LV EDV INDEX BP: 36 ML/M2
ECHO LV EDV NDEX A2C: 36 ML/M2
ECHO LV EJECTION FRACTION A2C: 64 %
ECHO LV EJECTION FRACTION A4C: 68 %
ECHO LV EJECTION FRACTION BIPLANE: 66 % (ref 55–100)
ECHO LV ESV A2C: 25 ML
ECHO LV ESV A4C: 21 ML
ECHO LV ESV BP: 24 ML (ref 19–49)
ECHO LV ESV INDEX A2C: 13 ML/M2
ECHO LV ESV INDEX A4C: 11 ML/M2
ECHO LV ESV INDEX BP: 12 ML/M2
ECHO LV FRACTIONAL SHORTENING: 34 % (ref 28–44)
ECHO LV INTERNAL DIMENSION DIASTOLE INDEX: 2.28 CM/M2
ECHO LV INTERNAL DIMENSION DIASTOLIC: 4.4 CM (ref 3.9–5.3)
ECHO LV INTERNAL DIMENSION SYSTOLIC INDEX: 1.5 CM/M2
ECHO LV INTERNAL DIMENSION SYSTOLIC: 2.9 CM
ECHO LV IVSD: 1.1 CM (ref 0.6–0.9)
ECHO LV MASS 2D: 168.9 G (ref 67–162)
ECHO LV MASS INDEX 2D: 87.5 G/M2 (ref 43–95)
ECHO LV POSTERIOR WALL DIASTOLIC: 1.1 CM (ref 0.6–0.9)
ECHO LV RELATIVE WALL THICKNESS RATIO: 0.5
ECHO LVOT AREA: 3.8 CM2
ECHO LVOT DIAM: 2.2 CM
ECHO LVOT PEAK GRADIENT: 4 MMHG
ECHO LVOT PEAK VELOCITY: 0.9 M/S
ECHO MV A VELOCITY: 0.77 M/S
ECHO MV AREA PHT: 2.9 CM2
ECHO MV E DECELERATION TIME (DT): 258.2 MS
ECHO MV E VELOCITY: 0.58 M/S
ECHO MV E/A RATIO: 0.75
ECHO MV E/E' LATERAL: 5.27
ECHO MV E/E' RATIO (AVERAGED): 8.44
ECHO MV E/E' SEPTAL: 11.6
ECHO MV PRESSURE HALF TIME (PHT): 74.9 MS
ECHO PULMONARY ARTERY END DIASTOLIC PRESSURE: 8 MMHG
ECHO PV ACCELERATION TIME (AT): 60.9 MS
ECHO PV MAX VELOCITY: 0.9 M/S
ECHO PV PEAK GRADIENT: 3 MMHG
ECHO PV REGURGITANT MAX VELOCITY: 1.4 M/S
ECHO RV FREE WALL PEAK S': 19 CM/S
ECHO RV TAPSE: 2 CM (ref 1.7–?)
ECHO TV REGURGITANT MAX VELOCITY: 2.7 M/S
ECHO TV REGURGITANT PEAK GRADIENT: 29 MMHG
ERYTHROCYTE [DISTWIDTH] IN BLOOD BY AUTOMATED COUNT: 12.5 % (ref 11.5–14.5)
GLUCOSE BLD STRIP.AUTO-MCNC: 261 MG/DL (ref 65–117)
GLUCOSE BLD STRIP.AUTO-MCNC: 433 MG/DL (ref 65–117)
GLUCOSE BLD STRIP.AUTO-MCNC: 471 MG/DL (ref 65–117)
GLUCOSE BLD STRIP.AUTO-MCNC: 492 MG/DL (ref 65–117)
GLUCOSE BLD STRIP.AUTO-MCNC: 514 MG/DL (ref 65–117)
GLUCOSE SERPL-MCNC: 433 MG/DL (ref 65–100)
HCT VFR BLD AUTO: 31.3 % (ref 35–47)
HDLC SERPL-MCNC: 36 MG/DL
HDLC SERPL: 4.7 (ref 0–5)
HGB BLD-MCNC: 10.4 G/DL (ref 11.5–16)
LDLC SERPL CALC-MCNC: ABNORMAL MG/DL (ref 0–100)
LDLC SERPL DIRECT ASSAY-MCNC: 87 MG/DL (ref 0–100)
MCH RBC QN AUTO: 29.9 PG (ref 26–34)
MCHC RBC AUTO-ENTMCNC: 33.2 G/DL (ref 30–36.5)
MCV RBC AUTO: 89.9 FL (ref 80–99)
NRBC # BLD: 0 K/UL (ref 0–0.01)
NRBC BLD-RTO: 0 PER 100 WBC
PLATELET # BLD AUTO: 265 K/UL (ref 150–400)
PMV BLD AUTO: 10.3 FL (ref 8.9–12.9)
POTASSIUM SERPL-SCNC: 3.9 MMOL/L (ref 3.5–5.1)
PROT UR-MCNC: 30 MG/DL (ref 0–11.9)
PROT/CREAT UR-RTO: 0.5
RBC # BLD AUTO: 3.48 M/UL (ref 3.8–5.2)
SERVICE CMNT-IMP: ABNORMAL
SODIUM SERPL-SCNC: 132 MMOL/L (ref 136–145)
SODIUM UR-SCNC: 50 MMOL/L
SPECIMEN HOLD: NORMAL
TRIGL SERPL-MCNC: 430 MG/DL
VLDLC SERPL CALC-MCNC: ABNORMAL MG/DL
WBC # BLD AUTO: 8 K/UL (ref 3.6–11)

## 2023-09-07 PROCEDURE — 97116 GAIT TRAINING THERAPY: CPT

## 2023-09-07 PROCEDURE — 80048 BASIC METABOLIC PNL TOTAL CA: CPT

## 2023-09-07 PROCEDURE — 6370000000 HC RX 637 (ALT 250 FOR IP): Performed by: FAMILY MEDICINE

## 2023-09-07 PROCEDURE — 2580000003 HC RX 258: Performed by: FAMILY MEDICINE

## 2023-09-07 PROCEDURE — 70547 MR ANGIOGRAPHY NECK W/O DYE: CPT

## 2023-09-07 PROCEDURE — 99233 SBSQ HOSP IP/OBS HIGH 50: CPT

## 2023-09-07 PROCEDURE — 84156 ASSAY OF PROTEIN URINE: CPT

## 2023-09-07 PROCEDURE — 6360000002 HC RX W HCPCS: Performed by: FAMILY MEDICINE

## 2023-09-07 PROCEDURE — 2060000000 HC ICU INTERMEDIATE R&B

## 2023-09-07 PROCEDURE — C8929 TTE W OR WO FOL WCON,DOPPLER: HCPCS

## 2023-09-07 PROCEDURE — 82962 GLUCOSE BLOOD TEST: CPT

## 2023-09-07 PROCEDURE — 36415 COLL VENOUS BLD VENIPUNCTURE: CPT

## 2023-09-07 PROCEDURE — 97165 OT EVAL LOW COMPLEX 30 MIN: CPT

## 2023-09-07 PROCEDURE — 99223 1ST HOSP IP/OBS HIGH 75: CPT | Performed by: PSYCHIATRY & NEUROLOGY

## 2023-09-07 PROCEDURE — 84300 ASSAY OF URINE SODIUM: CPT

## 2023-09-07 PROCEDURE — 82010 KETONE BODYS QUAN: CPT

## 2023-09-07 PROCEDURE — 97535 SELF CARE MNGMENT TRAINING: CPT

## 2023-09-07 PROCEDURE — 6360000004 HC RX CONTRAST MEDICATION: Performed by: FAMILY MEDICINE

## 2023-09-07 PROCEDURE — A4216 STERILE WATER/SALINE, 10 ML: HCPCS | Performed by: FAMILY MEDICINE

## 2023-09-07 PROCEDURE — 80061 LIPID PANEL: CPT

## 2023-09-07 PROCEDURE — 83721 ASSAY OF BLOOD LIPOPROTEIN: CPT

## 2023-09-07 PROCEDURE — 92610 EVALUATE SWALLOWING FUNCTION: CPT

## 2023-09-07 PROCEDURE — 97530 THERAPEUTIC ACTIVITIES: CPT

## 2023-09-07 PROCEDURE — 85027 COMPLETE CBC AUTOMATED: CPT

## 2023-09-07 PROCEDURE — 82570 ASSAY OF URINE CREATININE: CPT

## 2023-09-07 PROCEDURE — 6370000000 HC RX 637 (ALT 250 FOR IP)

## 2023-09-07 PROCEDURE — 97161 PT EVAL LOW COMPLEX 20 MIN: CPT

## 2023-09-07 RX ORDER — INSULIN LISPRO 100 [IU]/ML
0-4 INJECTION, SOLUTION INTRAVENOUS; SUBCUTANEOUS NIGHTLY
Status: DISCONTINUED | OUTPATIENT
Start: 2023-09-07 | End: 2023-09-09 | Stop reason: HOSPADM

## 2023-09-07 RX ORDER — AMLODIPINE BESYLATE 5 MG/1
10 TABLET ORAL DAILY
Status: DISCONTINUED | OUTPATIENT
Start: 2023-09-07 | End: 2023-09-09 | Stop reason: HOSPADM

## 2023-09-07 RX ORDER — TRAMADOL HYDROCHLORIDE 50 MG/1
50 TABLET ORAL EVERY 6 HOURS PRN
Status: DISCONTINUED | OUTPATIENT
Start: 2023-09-07 | End: 2023-09-09 | Stop reason: HOSPADM

## 2023-09-07 RX ORDER — DEXTROSE MONOHYDRATE 100 MG/ML
INJECTION, SOLUTION INTRAVENOUS CONTINUOUS PRN
Status: DISCONTINUED | OUTPATIENT
Start: 2023-09-07 | End: 2023-09-09 | Stop reason: HOSPADM

## 2023-09-07 RX ORDER — METOPROLOL SUCCINATE 50 MG/1
100 TABLET, EXTENDED RELEASE ORAL NIGHTLY
Status: DISCONTINUED | OUTPATIENT
Start: 2023-09-07 | End: 2023-09-09 | Stop reason: HOSPADM

## 2023-09-07 RX ORDER — INSULIN GLARGINE 100 [IU]/ML
25 INJECTION, SOLUTION SUBCUTANEOUS DAILY
Status: DISCONTINUED | OUTPATIENT
Start: 2023-09-07 | End: 2023-09-08

## 2023-09-07 RX ORDER — INSULIN LISPRO 100 [IU]/ML
0-8 INJECTION, SOLUTION INTRAVENOUS; SUBCUTANEOUS
Status: DISCONTINUED | OUTPATIENT
Start: 2023-09-07 | End: 2023-09-07

## 2023-09-07 RX ORDER — INSULIN LISPRO 100 [IU]/ML
6 INJECTION, SOLUTION INTRAVENOUS; SUBCUTANEOUS
Status: DISCONTINUED | OUTPATIENT
Start: 2023-09-07 | End: 2023-09-08

## 2023-09-07 RX ORDER — INSULIN LISPRO 100 [IU]/ML
0-10 INJECTION, SOLUTION INTRAVENOUS; SUBCUTANEOUS
Status: DISCONTINUED | OUTPATIENT
Start: 2023-09-07 | End: 2023-09-09 | Stop reason: HOSPADM

## 2023-09-07 RX ORDER — SPIRONOLACTONE 25 MG/1
25 TABLET ORAL DAILY
Status: DISCONTINUED | OUTPATIENT
Start: 2023-09-08 | End: 2023-09-09 | Stop reason: HOSPADM

## 2023-09-07 RX ADMIN — SODIUM CHLORIDE, PRESERVATIVE FREE 10 ML: 5 INJECTION INTRAVENOUS at 08:12

## 2023-09-07 RX ADMIN — ASPIRIN 81 MG CHEWABLE TABLET 81 MG: 81 TABLET CHEWABLE at 20:52

## 2023-09-07 RX ADMIN — AMLODIPINE BESYLATE 10 MG: 5 TABLET ORAL at 18:44

## 2023-09-07 RX ADMIN — PERFLUTREN 1 ML: 6.52 INJECTION, SUSPENSION INTRAVENOUS at 09:41

## 2023-09-07 RX ADMIN — SODIUM CHLORIDE, PRESERVATIVE FREE 10 ML: 5 INJECTION INTRAVENOUS at 20:54

## 2023-09-07 RX ADMIN — TRAMADOL HYDROCHLORIDE 50 MG: 50 TABLET ORAL at 15:45

## 2023-09-07 RX ADMIN — METOPROLOL SUCCINATE 100 MG: 50 TABLET, EXTENDED RELEASE ORAL at 20:54

## 2023-09-07 RX ADMIN — Medication 6 UNITS: at 16:27

## 2023-09-07 RX ADMIN — Medication 10 UNITS: at 08:07

## 2023-09-07 RX ADMIN — ENOXAPARIN SODIUM 30 MG: 100 INJECTION SUBCUTANEOUS at 08:09

## 2023-09-07 RX ADMIN — ZOLPIDEM TARTRATE 10 MG: 5 TABLET ORAL at 20:56

## 2023-09-07 RX ADMIN — Medication 6 UNITS: at 11:30

## 2023-09-07 RX ADMIN — INSULIN GLARGINE 25 UNITS: 100 INJECTION, SOLUTION SUBCUTANEOUS at 08:09

## 2023-09-07 RX ADMIN — Medication 10 UNITS: at 16:28

## 2023-09-07 RX ADMIN — Medication 3 UNITS: at 22:01

## 2023-09-07 RX ADMIN — ROSUVASTATIN 40 MG: 40 TABLET, FILM COATED ORAL at 20:55

## 2023-09-07 RX ADMIN — Medication 10 UNITS: at 11:30

## 2023-09-07 ASSESSMENT — PAIN DESCRIPTION - DESCRIPTORS: DESCRIPTORS: ACHING

## 2023-09-07 ASSESSMENT — PAIN DESCRIPTION - ORIENTATION: ORIENTATION: LEFT

## 2023-09-07 ASSESSMENT — PAIN DESCRIPTION - LOCATION: LOCATION: HEAD

## 2023-09-07 ASSESSMENT — PAIN SCALES - GENERAL: PAINLEVEL_OUTOF10: 6

## 2023-09-07 NOTE — PROGRESS NOTES
History:   Procedure Laterality Date    CHOLECYSTECTOMY      HYSTERECTOMY (CERVIX STATUS UNKNOWN)         Home Situation and Prior Level of Function: Lives with family, independent with mobility and ADLs  Social/Functional History  Lives With: Spouse, Daughter (Grandchildren)  Type of Home: Mobile home  Home Layout: One level  Home Access: Level entry  Bathroom Shower/Tub: Tub/Shower unit  Bathroom Toilet: Standard  Bathroom Equipment: None  Home Equipment: Cane  Has the patient had two or more falls in the past year or any fall with injury in the past year?: No  ADL Assistance: Independent  Ambulation Assistance: Independent  Transfer Assistance: Independent  Active : Yes  Critical Behavior:  Orientation  Overall Orientation Status: Within Normal Limits  Orientation Level: Oriented X4  Cognition  Overall Cognitive Status: WFL    Hearing:   Hearing  Hearing: Within functional limits    Vision/Perceptual:          Vision  Vision: Impaired  Vision Exceptions: Wears glasses at all times (Did not have them during eval)  Perception  Overall Perceptual Status: WFL    Strength:    Strength:  Within functional limits    Tone & Sensation:   Tone: Normal  Sensation: Impaired (baseline 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)    Coordination:  Coordination: Within functional limits    Range Of Motion:  AROM: Within functional limits  PROM: Within functional limits    Functional Mobility:  Bed Mobility:        Transfers:     Transfer Training  Transfer Training: Yes  Sit to Stand: Modified independent  Stand to Sit: Modified independent  Balance:               Balance  Sitting: Intact  Standing: Impaired  Standing - Static: Good  Standing - Dynamic: Good  Ambulation/Gait Training:                       Gait  Overall Level of Assistance: Modified independent  Gait Abnormalities: Shuffling gait;Trunk sway increased  Distance (ft): 20 Feet  Assistive Device: Gait belt Bed/ chair alarm activated      COMMUNICATION/EDUCATION:   The patient's plan of care was discussed with: occupational therapist and registered nurse    Thank you for this referral.  Deloris Osborne, PT, DPT  Minutes: 32

## 2023-09-07 NOTE — CARE COORDINATION
Care Management Initial Assessment       RUR:13%  Readmission? No  1st IM letter given? Yes  9/7/23  1st  letter given: No   Patient has [facility]  Secures medications at [address] [facility]    Admission  Headache, ischemic brain injury  stroke like symptoms    CM met with patient in her room to introduce self and explain role. Patient lives with her , daughter and 3 grandchildren in 3 bedroom 2 bathroom trailer. No steps-  Patient was self care and independent with adl's and iadl's prior to admission--she does drive-- has a cane for mobility if needed. No hx of HH or Rehab. Patient and  are disabled. Both receive SSDI. Patient worked for [employer]. Patient was evaluated by therapy-- no skilled [address] [facility] services recommended. Cm will follow patient's medical progress and assist with any needs that arise prior to discharge. Family will transport home. 2nd Medicare letter will be provided prior to discharge        09/07/23 3337   Service Assessment   Patient Orientation Alert and Oriented   Cognition Alert   History Provided By Patient   Primary Caregiver Self   Support Systems Spouse/Significant Other;Children;Family Members   PCP Verified by CM Yes   Last Visit to PCP Within last 3 months   Prior Functional Level Independent in ADLs/IADLs   Current Functional Level Independent in ADLs/IADLs   Can patient return to prior living arrangement Yes   Ability to make needs known: Good   Family able to assist with home care needs: Yes   Would you like for me to discuss the discharge plan with any other family members/significant others, and if so, who?  No  (patient will contact family)   Social/Functional History   Lives With Spouse;Daughter   Type of [facility] [address] One level   Home Access Level entry   Bathroom Shower/Tub Tub/Shower unit   [address]

## 2023-09-07 NOTE — PLAN OF CARE
Problem: Discharge Planning  Goal: Discharge to home or other facility with appropriate resources  9/6/2023 2343 by Harvey Parish RN  Outcome: Progressing  Flowsheets (Taken 9/6/2023 2000)  Discharge to home or other facility with appropriate resources: Identify barriers to discharge with patient and caregiver  9/6/2023 1908 by Sangita Huertas RN  Outcome: [facility] 114 Resolved Not Met  Flowsheets (Taken 9/6/2023 1845)  Discharge to home or other facility with appropriate resources: Identify barriers to discharge with patient and caregiver     Problem: Pain  Goal: Verbalizes/displays adequate comfort level or baseline comfort level  9/6/2023 2343 by Harvey Parish RN  Outcome: Progressing  9/6/2023 1908 by Sangita Huertas RN  Outcome: [facility] 114 Resolved Not Met     Problem: Chronic Conditions and Co-morbidities  Goal: Patient's chronic conditions and co-morbidity symptoms are monitored and maintained or improved  9/6/2023 2343 by Harvey Parish RN  Outcome: Progressing  Flowsheets (Taken 9/6/2023 2000)  Care Plan - Patient's Chronic Conditions and Co-Morbidity Symptoms are Monitored and Maintained or Improved: Monitor and assess patient's chronic conditions and comorbid symptoms for stability, deterioration, or improvement  9/6/2023 1908 by Sangita Huertas RN  Outcome: [facility] 114 Resolved Not Met     Problem: Safety - Adult  Goal: Free from fall injury  Outcome: Progressing

## 2023-09-07 NOTE — CONSULTS
Neurology Consult Note     NAME: Joey Chavarria   :  1965   MRN:  [id number]   DATE:  2023       HPI:  Pt is a 64yo RH female admitted 23 on transfer from the [address] [place] [address],[address] with c/o left sided/posterior orbital HA since 3AM. Pt reported she had been on vacation and did not take any of her medication. /82. CTH left occipital hypoattenuation. MRI brain with acute vs subacute left temporoccipital CVA, chronic right CS lacunar stroke. MRA Head no LVO, incidental 3x2mm left intracranial ICA aneurysm. No vascular imaging of neck done. TTE pending. EKG - NSR. LDL 87.  HgbA1C >14. Glu 478. BUN/Cr 54/2. 51. Pt reports she went on vacation to [facility] [place] [place] and then [place] for 1.5 weeks and stopped all of her meds b/c she felt they were making her sick. She has h/o CVA, HTN, HLD, DM, was supposed to be taking an ASA 81mg daily, but even prior to vacation she was only taking it when she remembered. She quit smoking in , no known ENOC, but has insomnia. +Family h/o CVA in sibs. Pt only c/o left sided HA, denies any neurological deficits. Pt states someone mentioned lupus to her, she states she has never been diagnosed with or treated for lupus. PMH:  TIA or CVA in   HTN  HLD  DM  Diabetic PN  SLE - pt denies  Lumbar HNP  S/p toshia  S/p hysterectomy      ROS:  Per HPI o/w neg.        MEDS:  Home:  Current Outpatient Medications   Medication Instructions    amLODIPine (NORVASC) 5 MG tablet ceived the following from [facility]: Outside name: amLODIPine (NORVASC) 5 mg tablet    Aspirin-Calcium Carbonate  MG TABS 81 mg, Oral    atorvastatin (LIPITOR) 40 mg, DAILY    ergocalciferol (ERGOCALCIFEROL) 1.25 MG (52027 UT) capsule ceived the following from [facility]: Outside name: ergocalciferol (ERGOCALCIFEROL) 50,000 unit capsule ibuprofen (ADVIL;MOTRIN) 800 mg, EVERY 8 HOURS PRN    Lantus SoloStar 20 Units, SubCUTAneous, EVERY 24 HOURS    leflunomide (ARAVA) 20 mg, DAILY    lidocaine (LIDODERM) 5 % Apply patch to the affected area for 12 hours a day and remove for 12 hours a day. metFORMIN (GLUCOPHAGE) 1000 MG tablet 1 tablet, Oral, 2 TIMES DAILY    metoprolol succinate (TOPROL XL) 100 MG extended release tablet ceived the following from [facility]: Outside name: metoprolol succinate (TOPROL-XL) 100 mg tablet    MOUNJARO 2.5 MG/0.5ML SOPN SC injection INJECT 1 PEN SUBCUTANEOUSLY ONCE A WEEK    oxyCODONE-acetaminophen (PERCOCET) 5-325 MG per tablet ceived the following from [facility]: Outside name: oxyCODONE-acetaminophen (PERCOCET) 5-325 mg per tablet    spironolactone (ALDACTONE) 25 MG tablet 1 tablet, Oral, DAILY    zolpidem (AMBIEN) 10 MG tablet nightly.          Current Facility-Administered Medications:     traMADol (ULTRAM) tablet 50 mg, 50 mg, Oral, Q6H PRN, Theresa Justin MD    insulin glargine (LANTUS) injection vial 25 Units, 25 Units, SubCUTAneous, Daily, Merlin Monae MD, 25 Units at 09/07/23 0809    insulin lispro (HUMALOG) injection vial 0-4 Units, 0-4 Units, SubCUTAneous, Nightly, Merlin Monae MD    glucose chewable tablet 16 g, 4 tablet, Oral, PRN, AV Perez - CNS    dextrose bolus 10% 125 mL, 125 mL, IntraVENous, PRN **OR** dextrose bolus 10% 250 mL, 250 mL, IntraVENous, PRN, AV Perez - CNS    glucagon injection 1 mg, 1 mg, SubCUTAneous, PRN, AV Perez - CNS    dextrose 10 % infusion, , IntraVENous, Continuous PRN, AV Perez    insulin lispro (HUMALOG) injection vial 6 Units, 6 Units, SubCUTAneous, TID Leo LEMUS APRN - CNS, 6 Units at 09/07/23 1130    insulin lispro (HUMALOG) injection vial 0-10 Units, 0-10 Units, SubCUTAneous, TID MRAIAH Merlin Monae MD, 10 Units at 09/07/23 1130    sodium chloride

## 2023-09-07 NOTE — PROGRESS NOTES
Hospitalist Progress Note  Kriss Snell MD  Answering service: [phone] from in house phone        Date of Service:  2023  NAME:  Ben Kruegre  :  1965  MRN:  [id number]      Admission Summary:   Ben Krueger is a 66 y.o. female with medical history of DM2, dyslipidemia, hypertension,  lumbar herniated disc who presents as a transfer [facility] ED for chief complaint of headache, and is being admitted for hypertensive urgency, and stroke. Reports that she was in her usual state of health until she woke up at 4 AM with a headache worse above her left orbit, and visual field impairment. She denies other focal deficit     In the ED, pressure was elevated to 225/101. Other vitals were stable. Labs are significant for BUN 54, creatinine 2.51 (b/l 1.3-1.5), and glucose 478. CT head showed mild left subcortical cortical and subcortical hypoattenuation concerning for acute versus subacute ischemic injury. MRI/MRA shows acute versus subacute infarct in the left PCA territory, and an intracranial left ICA aneurysm. EKG NSR at 82 bpm.     In the ED, she received 10 units regular insulin,100 mg metoprolol, Plavix, and aspirin. She received 1 L normal saline. Interval history / Subjective:   Patient seen and examined  Reviewed her vitals labs test results and care plan  Neurology consulted for evaluation and to complete stroke work-up  Brain MRI showing acute/subacute CVA and intracranial left ICA aneurysm     Assessment & Plan:     1. Acute/subacute CVA  Reviewed brain MRI results-   Neurology consulted with recs noted  Cont antiplatelet agent with statin med  2. Intracranial ICA aneurysm on the left  Consult neurointerventional team in am tomorrow for eval  3. Diabetes mellitus uncontrolled  Hemoglobin A1c greater than 14  Consulted diabetes team for assistance with management  4. interpreted patient's lab and all other diagnostic data    Notes reviewed from all clinical/nonclinical/nursing services involved in patient's clinical care. Care coordination discussions were held with appropriate clinical/nonclinical/ nursing providers based on care coordination needs. Labs:     Recent Labs     09/06/23  1119 09/07/23  0152   WBC 9.1 8.0   HGB 11.5 10.4*   HCT 34.1* 31.3*    265     Recent Labs     09/06/23  1119   *   K 4.7   CL 97   CO2 25   BUN 54*   MG 1.8     Recent Labs     09/06/23  1119   ALT 32   GLOB 5.3*     No results for input(s): INR, APTT in the last 72 hours. Invalid input(s): PTP   No results for input(s): TIBC, FERR in the last 72 hours. Invalid input(s): FE, PSAT   No results found for: FOL, RBCF   No results for input(s): PH, PCO2, PO2 in the last 72 hours. No results for input(s): CPK in the last 72 hours.     Invalid input(s): CPKMB, CKNDX, TROIQ  Lab Results   Component Value Date/Time    CHOL 169 09/07/2023 01:52 AM    HDL 36 09/07/2023 01:52 AM     No results found for: GLUCPOC        Medications Reviewed:     Current Facility-Administered Medications   Medication Dose Route Frequency    traMADol (ULTRAM) tablet 50 mg  50 mg Oral Q6H PRN    sodium chloride 0.9 % bolus 1,000 mL  1,000 mL IntraVENous Once    sodium chloride flush 0.9 % injection 5-40 mL  5-40 mL IntraVENous 2 times per day    sodium chloride flush 0.9 % injection 5-40 mL  5-40 mL IntraVENous PRN    0.9 % sodium chloride infusion   IntraVENous PRN    ondansetron (ZOFRAN-ODT) disintegrating tablet 4 mg  4 mg Oral Q8H PRN    Or    ondansetron (ZOFRAN) injection 4 mg  4 mg IntraVENous Q6H PRN    polyethylene glycol (GLYCOLAX) packet 17 g  17 g Oral Daily PRN    enoxaparin Sodium (LOVENOX) injection 30 mg  30 mg SubCUTAneous Daily    rosuvastatin (CRESTOR) tablet 40 mg  40 mg Oral Nightly    aspirin chewable tablet 81 mg  81 mg Oral Daily    Or    aspirin suppository 300 mg  300 mg Rectal

## 2023-09-07 NOTE — H&P
History and Physical    Date of Service:  9/6/2023  Primary Care Provider: AV Thao NP  Source of information: patient, and EMR    Chief Complaint: Headache      History of Presenting Illness:   Airam Jang. Page Sethi is a 66 y.o. female with medical history of DM2, dyslipidemia, hypertension,  lumbar herniated disc who presents [facility] a [facility] for chief complaint of headache, and is being admitted for hypertensive urgency, and stroke. Reports that she was in her usual state of health until she woke up at 4 AM with a headache worse above her left orbit, and visual field impairment. She denies other focal deficit    In the ED, pressure was elevated to 225/101. Other vitals were stable. Labs are significant for BUN 54, creatinine 2.51 (b/l 1.3-1.5), and glucose 478. CT head showed mild left subcortical cortical and subcortical hypoattenuation concerning for acute versus subacute ischemic injury. MRI/MRA shows acute versus subacute infarct in the left PCA territory, and an intracranial left ICA aneurysm. EKG NSR at 82 bpm.    In the ED, she received 10 units regular insulin,100 mg metoprolol, Plavix, and aspirin. She received 1 L normal saline. REVIEW OF SYSTEMS:  A comprehensive review of systems was negative except for that written in the History of Present Illness. Past Medical History:   Diagnosis Date    Diabetes ([address])     Hyperlipidemia     Hypertension     Lumbar herniated disc     Lupus ([address])       Past Surgical History:   Procedure Laterality Date    CHOLECYSTECTOMY      HYSTERECTOMY (CERVIX STATUS UNKNOWN)       Prior to Admission medications    Medication Sig Start Date End Date Taking?  Authorizing Provider   MOUNJARO 2.5 MG/0.5ML SOPN SC injection INJECT 1 PEN SUBCUTANEOUSLY ONCE A WEEK 7/8/23   Historical Provider, MD   amLODIPine (NORVASC) 5 MG tablet ceived the following from [address] [facility]: Outside name: amLODIPine (NORVASC) 5 mg tablet 6/26/19 dictation may have been completed with Dragon, the computer voice recognition software. Quite often unanticipated grammatical, syntax, homophones, and other interpretive errors are inadvertently transcribed by the computer software. Please disregard these errors. Please excuse any errors that have escaped final proofreading.

## 2023-09-07 NOTE — PROGRESS NOTES
NUTRITION     Nutrition screening referral was triggered based on results obtained during nursing admission assessment for 14-33# unintentional wt loss, no reported loss of appetite. Wt Readings from Last 3 Encounters:   09/07/23 90.1 kg (198 lb 10.2 oz)   03/24/21 94.8 kg (209 lb)        Admitted for headache, acute vs subacute infarct in L PCA. The patient's chart was reviewed and nutrition assessment is not indicated at this time. Please consult if pt PO intakes poor over next 3-5 days. Thank you.      Sheela Anderson RD  Contact via [employer] or ext [phone]

## 2023-09-07 NOTE — PLAN OF CARE
Problem: Discharge Planning  Goal: Discharge to home or other facility with appropriate resources  9/7/2023 0951 by Kentrell Bentley RN  Outcome: Progressing  9/7/2023 0921 by Kentrell Bentley RN  Outcome: Progressing  Flowsheets (Taken 9/7/2023 0800)  Discharge to home or other facility with appropriate resources: Identify barriers to discharge with patient and caregiver  9/6/2023 2343 by Scott Marcus RN  Outcome: Progressing  Flowsheets (Taken 9/6/2023 2000)  Discharge to home or other facility with appropriate resources: Identify barriers to discharge with patient and caregiver     Problem: Pain  Goal: Verbalizes/displays adequate comfort level or baseline comfort level  9/7/2023 0951 by Kentrell Bentley RN  Outcome: Progressing  9/7/2023 0921 by Kentrell Bentley RN  Outcome: Progressing  9/6/2023 2343 by Scott Marcus RN  Outcome: Progressing     Problem: Chronic Conditions and Co-morbidities  Goal: Patient's chronic conditions and co-morbidity symptoms are monitored and maintained or improved  9/7/2023 0951 by Kentrell Bentley RN  Outcome: Progressing  9/7/2023 0921 by Kentrell Bentley RN  Outcome: Progressing  Flowsheets (Taken 9/7/2023 0800)  Care Plan - Patient's Chronic Conditions and Co-Morbidity Symptoms are Monitored and Maintained or Improved: Monitor and assess patient's chronic conditions and comorbid symptoms for stability, deterioration, or improvement  9/6/2023 2343 by Scott Marcus RN  Outcome: Progressing  Flowsheets (Taken 9/6/2023 2000)  Care Plan - Patient's Chronic Conditions and Co-Morbidity Symptoms are Monitored and Maintained or Improved: Monitor and assess patient's chronic conditions and comorbid symptoms for stability, deterioration, or improvement     Problem: Safety - Adult  Goal: Free from fall injury  9/7/2023 0951 by Kentrell Bentley RN  Outcome: Progressing  9/7/2023 0921 by Kentrell Bentley RN  Outcome: Progressing  9/6/2023 2343 by Scott Marcus RN  Outcome: Progressing

## 2023-09-08 LAB
GLUCOSE BLD STRIP.AUTO-MCNC: 215 MG/DL (ref 65–117)
GLUCOSE BLD STRIP.AUTO-MCNC: 287 MG/DL (ref 65–117)
GLUCOSE BLD STRIP.AUTO-MCNC: 354 MG/DL (ref 65–117)
GLUCOSE BLD STRIP.AUTO-MCNC: 385 MG/DL (ref 65–117)
SERVICE CMNT-IMP: ABNORMAL

## 2023-09-08 PROCEDURE — 2580000003 HC RX 258: Performed by: FAMILY MEDICINE

## 2023-09-08 PROCEDURE — 82962 GLUCOSE BLOOD TEST: CPT

## 2023-09-08 PROCEDURE — 6360000002 HC RX W HCPCS: Performed by: FAMILY MEDICINE

## 2023-09-08 PROCEDURE — 6370000000 HC RX 637 (ALT 250 FOR IP)

## 2023-09-08 PROCEDURE — 99233 SBSQ HOSP IP/OBS HIGH 50: CPT | Performed by: NURSE PRACTITIONER

## 2023-09-08 PROCEDURE — 99231 SBSQ HOSP IP/OBS SF/LOW 25: CPT

## 2023-09-08 PROCEDURE — 6370000000 HC RX 637 (ALT 250 FOR IP): Performed by: FAMILY MEDICINE

## 2023-09-08 PROCEDURE — 2060000000 HC ICU INTERMEDIATE R&B

## 2023-09-08 RX ORDER — INSULIN ASPART 100 [IU]/ML
10 INJECTION, SOLUTION INTRAVENOUS; SUBCUTANEOUS
Qty: 5 ADJUSTABLE DOSE PRE-FILLED PEN SYRINGE | Refills: 3 | Status: SHIPPED | OUTPATIENT
Start: 2023-09-08

## 2023-09-08 RX ORDER — INSULIN GLARGINE 100 [IU]/ML
36 INJECTION, SOLUTION SUBCUTANEOUS DAILY
Status: DISCONTINUED | OUTPATIENT
Start: 2023-09-08 | End: 2023-09-09 | Stop reason: HOSPADM

## 2023-09-08 RX ORDER — INSULIN GLARGINE 100 [IU]/ML
45 INJECTION, SOLUTION SUBCUTANEOUS DAILY
Qty: 5 ADJUSTABLE DOSE PRE-FILLED PEN SYRINGE | Refills: 3 | Status: SHIPPED | OUTPATIENT
Start: 2023-09-08

## 2023-09-08 RX ORDER — PEN NEEDLE, DIABETIC 31 GX5/16"
1 NEEDLE, DISPOSABLE MISCELLANEOUS DAILY
Qty: 100 EACH | Refills: 3 | Status: SHIPPED | OUTPATIENT
Start: 2023-09-08

## 2023-09-08 RX ORDER — INSULIN LISPRO 100 [IU]/ML
8 INJECTION, SOLUTION INTRAVENOUS; SUBCUTANEOUS
Status: DISCONTINUED | OUTPATIENT
Start: 2023-09-08 | End: 2023-09-09

## 2023-09-08 RX ADMIN — ZOLPIDEM TARTRATE 10 MG: 5 TABLET ORAL at 21:32

## 2023-09-08 RX ADMIN — Medication 3 UNITS: at 21:38

## 2023-09-08 RX ADMIN — SODIUM CHLORIDE, PRESERVATIVE FREE 10 ML: 5 INJECTION INTRAVENOUS at 21:36

## 2023-09-08 RX ADMIN — Medication 8 UNITS: at 16:11

## 2023-09-08 RX ADMIN — ENOXAPARIN SODIUM 30 MG: 100 INJECTION SUBCUTANEOUS at 08:18

## 2023-09-08 RX ADMIN — INSULIN GLARGINE 36 UNITS: 100 INJECTION, SOLUTION SUBCUTANEOUS at 08:19

## 2023-09-08 RX ADMIN — Medication 6 UNITS: at 08:21

## 2023-09-08 RX ADMIN — ROSUVASTATIN 40 MG: 40 TABLET, FILM COATED ORAL at 21:32

## 2023-09-08 RX ADMIN — Medication 10 UNITS: at 16:11

## 2023-09-08 RX ADMIN — ASPIRIN 81 MG CHEWABLE TABLET 81 MG: 81 TABLET CHEWABLE at 08:17

## 2023-09-08 RX ADMIN — Medication 6 UNITS: at 08:20

## 2023-09-08 RX ADMIN — METOPROLOL SUCCINATE 100 MG: 50 TABLET, EXTENDED RELEASE ORAL at 21:32

## 2023-09-08 RX ADMIN — SODIUM CHLORIDE, PRESERVATIVE FREE 10 ML: 5 INJECTION INTRAVENOUS at 08:23

## 2023-09-08 RX ADMIN — AMLODIPINE BESYLATE 10 MG: 5 TABLET ORAL at 08:18

## 2023-09-08 RX ADMIN — Medication 10 UNITS: at 11:37

## 2023-09-08 RX ADMIN — Medication 8 UNITS: at 11:37

## 2023-09-08 RX ADMIN — SPIRONOLACTONE 25 MG: 25 TABLET ORAL at 08:18

## 2023-09-08 NOTE — PROGRESS NOTES
Neurology Progress Note     NAME: Darius Banda   :  1965   MRN:  [id number]   DATE:  2023    Assessment:     Principal Problem:    Stroke-like symptoms  Active Problems:    Nonintractable headache    Hypertensive crisis    Type 2 diabetes mellitus with hyperglycemia (HCC)    Acute ischemic stroke ([address])    Primary hypertension    Hyperlipidemia    Uncontrolled type 2 diabetes mellitus with hyperglycemia ([address])  Resolved Problems:    * No resolved hospital problems. *    Patient is a 66year-old R-handed female with history of HTN, DM, CVA  who presented on 23 with complaint of L-sided posterior orbital headache. She states she was not told the reason for her previous strokes 20 years ago but was told to take aspirin. She recently returned from a vacation and had not been taking any of her medications for 1.5 weeks because she felt they were making her sick. Meds she was not taking include aspirin 81 mg and metformin, however, she states she was taking metoprolol. BP was noted to be 202/82. CT head showed L occipital hypoattenuation. MRI brain with acute versus subacute L temporal-occipital infarct and there is a chronic R centrum semiovale and subthalamic chronic lacunar infarcts. MRA head/neck no significant stenosis or occlusion. Small L ICA aneurysm 3 x 2 mm. TTE showed EF 65-70%, abnormal diastolic function, LA is normal. R to L shunt seen at rest. Positive bubble study. HgbA1c>14, LDL 87, , BUN/Cr 54/2.51  Plan:   1.) MRI acute/subacute L temporal-occipital infarct:  - Unclear work-up from chronic, lacunar strokes in  but likely small vessel disease if lacunar.  Subacute stroke possible due to multiple risk factors, however, cannot exclude cardioembolic cause (see below)  - Consult Cardiology Dr. Ana Maria Kumari for PFO seen on TTE  - Continue performed through the  extracranial vessels and 3-D reconstructions were performed. Multiplanar  reconstructions were performed and evaluated by the radiologist within PACS. COMPARISON: None. FINDINGS:  Bovine arch is a normal variant. The vertebral arteries are codominant. No  significant extracranial internal carotid artery stenosis, as measured with  respect to the distal    Impression  No hemodynamically significant extracranial ICA stenosis (using NASCET  criteria). CT Result (most recent):  CT HEAD WO CONTRAST 09/06/2023    Narrative  INDICATION:   Headache    EXAM:  HEAD CT WITHOUT CONTRAST    COMPARISON:  None    TECHNIQUE:  Routine noncontrast axial head CT was performed. Coronal and  sagittal multiplanar reconstructions were obtained. CT dose reduction was  achieved through use of a standardized protocol tailored for this examination  and automatic exposure control for dose modulation. FINDINGS:    The ventricles and cortical sulci are within normal limits. No evidence for acute intracranial hemorrhage, midline shift, or mass effect. Mild left occipital cortical and subcortical hypoattenuation concerning for  acute or subacute ischemic injury    The basal cisterns are patent. The visualized paranasal sinuses and mastoid air cells are clear. No calvarial abnormality. Impression  Mild left occipital cortical and subcortical hypoattenuation concerning for  acute or subacute ischemic injury. Care Plan discussed with:  Patient x   Family x   RN    Care Manager    Consultant/Specialist:  prateek     Signed:AV Lindsey - NP      I spent >50% of a 50-minute visit counseling/coordination of care with the patient and family at the bedside discussing MRI results, TTE results, stroke risk factors and plan.

## 2023-09-08 NOTE — CONSULTS
meals   Corrective insulin Using medium dose sensitivity based on weight and renal function        Diabetes Discharge Plan   Medication: stop Metformin (patient had diarrhea and wasn't really taking anyway)                      Start Lantus (or equivalent) 36 units daily                      Start Humalog or Novolog 8 units with meals     Referral  []        Outpatient diabetes education   Additional orders:  Please check BG 2-3 times/day prior to meals (already has glucometer)  Follow up with PCP            Initial Presentation   Jessica DasShiv Hollins is a 66 y.o. female admitted on 9/6/23 after experiencing a headache, specifically behind left eye. LAB:, A1c > 14%, creat 2.51  CT head:IMPRESSION:   Mild left occipital cortical and subcortical hypoattenuation concerning for  acute or subacute ischemic injury. MRI: IMPRESSION:  MRI: Acute versus subacute infarct in the left PCA territory, consistent with  previous CT finding. Right subthalamic and centrum semiovale chronic lacunar infarcts. MRA:   No large vessel occlusion or significant luminal narrowing. Intracranial left ICA aneurysm measuring up to 3 x 2 mm. HX:   Past Medical History:   Diagnosis Date    Diabetes ([address])     Hyperlipidemia     Hypertension     Lumbar herniated disc     Lupus ([address])     Pt denies h/o Lupus 9/7/23        INITIAL DX: Hypertensive crisis [I16.9]  Ischemic brain injury [I67.82]  NANCI (acute kidney injury) ([address]) [N17.9]  Stroke-like symptoms [R29.90]  Nonintractable headache, unspecified chronicity pattern, unspecified headache type [R51.9]     Current Treatment     TX: neuro consult, BP management, ASA, Lovenox, insulin , statin    Hospital Course   Clinical progress has been complicated by HTN and hyperglycemia. Diabetes History   T2D since age 42. Positive family history.     Diabetes-related Medical History  Acute complications  Hyperglycemia   Neurological complications  Gastroparesis and Peripheral initial hospital care - 40 minutes     Before making these care recommendations, I personally reviewed the hospitalization record, including notes, laboratory & diagnostic data and current medications, and examined the patient at the bedside (circumstances permitting) before determining care. More than fifty (50) percent of the time was spent in patient counseling and/or care coordination.   Total minutes: 22    AV GIVENS - CNS  Diabetes Clinical Nurse Specialist  [facility]  Access via [employer]

## 2023-09-08 NOTE — PLAN OF CARE
Problem: Discharge Planning  Goal: Discharge to home or other facility with appropriate resources  9/7/2023 2252 by Hunter Yuen RN  Outcome: Progressing  Flowsheets (Taken 9/7/2023 2000)  Discharge to home or other facility with appropriate resources: Identify barriers to discharge with patient and caregiver     Problem: Pain  Goal: Verbalizes/displays adequate comfort level or baseline comfort level  9/7/2023 2252 by Hunter Yuen RN  Outcome: Progressing     Problem: Chronic Conditions and Co-morbidities  Goal: Patient's chronic conditions and co-morbidity symptoms are monitored and maintained or improved  9/7/2023 2252 by Hunter Yuen RN  Outcome: Progressing  Flowsheets (Taken 9/7/2023 2000)  Care Plan - Patient's Chronic Conditions and Co-Morbidity Symptoms are Monitored and Maintained or Improved: Monitor and assess patient's chronic conditions and comorbid symptoms for stability, deterioration, or improvement     Problem: Safety - Adult  Goal: Free from fall injury  9/7/2023 2252 by Hunter Yuen RN  Outcome: Progressing

## 2023-09-08 NOTE — CONSULTS
[facility] Cardiology Consultation    Date of Consult:  09/08/23  Date of Admission: 9/6/2023  Primary Cardiologist: None  Physician Requesting consult: BEHZAD Braun    Chief Complaint / Reason for Consult:   Acute stroke  PFO    Assessment/Recommendations:    Acute stroke  Was not taking aspirin; query if stroke was related to medication non-adherence  H/o remote strokes; raises concern for possible paradoxical embolism from PFO  However, given her multiple vascular risk factors, unclear if PFO is implicated  Medication management per neurology  Will arrange 30 day MCOT, can  from [address] [facility] office, [address]  PFO  Personally reviewed TTE which shows  bubbles at rest; suggesting a moderate to large shunt  Recommend QI for better evaluation, will schedule as an outpatient  Patient has multiple vascular risk factors (HL, uncontrolled HTN, uncontrolled DM2) and unclear if PFO is implicated in stroke  If no high risk PFO features, then would not recommend PFO closure at this time given her uncontrolled vascular RF  DM2   HTN  HL    Discussed with Dr. Bang Noyola. Thank you for the opportunity to participate in the care of Matty Su and please do not hesitate to contact us should you have any questions. History of Present Illness:  Matty Su is a 66 y.o. female with the below listed medical history who was admitted with left posterior orbital headache. From notes:  \"66 year-old R-handed female with history of HTN, DM, CVA 2004 who presented on 9/6/23 with complaint of L-sided posterior orbital headache. She states she was not told the reason for her previous strokes 20 years ago but was told to take aspirin. She recently returned from a vacation and had not been taking any of her medications for 1.5 weeks because she felt they were making her sick. Meds she was not taking include aspirin 81 mg and metformin, however, she states she was taking metoprolol. BP was noted to be 202/82.  CT

## 2023-09-08 NOTE — PLAN OF CARE
Problem: Discharge Planning  Goal: Discharge to home or other facility with appropriate resources  9/7/2023 2252 by Angel Luis Barger RN  Outcome: Progressing  Flowsheets (Taken 9/7/2023 2000)  Discharge to home or other facility with appropriate resources: Identify barriers to discharge with patient and caregiver  9/7/2023 0951 by Leopoldo Finn, RN  Outcome: Progressing  9/7/2023 0921 by Leopoldo Finn, RN  Outcome: Progressing  Flowsheets (Taken 9/7/2023 0800)  Discharge to home or other facility with appropriate resources: Identify barriers to discharge with patient and caregiver     Problem: Pain  Goal: Verbalizes/displays adequate comfort level or baseline comfort level  9/7/2023 2252 by Angel Luis Barger RN  Outcome: Progressing  9/7/2023 0951 by Leopoldo Finn, RN  Outcome: Progressing  9/7/2023 0921 by Leopoldo Finn, RN  Outcome: Progressing     Problem: Chronic Conditions and Co-morbidities  Goal: Patient's chronic conditions and co-morbidity symptoms are monitored and maintained or improved  9/7/2023 2252 by Angel Luis Barger RN  Outcome: Progressing  Flowsheets (Taken 9/7/2023 2000)  Care Plan - Patient's Chronic Conditions and Co-Morbidity Symptoms are Monitored and Maintained or Improved: Monitor and assess patient's chronic conditions and comorbid symptoms for stability, deterioration, or improvement  9/7/2023 0951 by Leopoldo Finn, RN  Outcome: Progressing  9/7/2023 0921 by Leopoldo Finn, RN  Outcome: Progressing  Flowsheets (Taken 9/7/2023 0800)  Care Plan - Patient's Chronic Conditions and Co-Morbidity Symptoms are Monitored and Maintained or Improved: Monitor and assess patient's chronic conditions and comorbid symptoms for stability, deterioration, or improvement     Problem: Safety - Adult  Goal: Free from fall injury  9/7/2023 2252 by Angel Luis Barger RN  Outcome: Progressing  9/7/2023 0951 by Leopoldo Finn, RN  Outcome: Progressing  9/7/2023 0921 by Leopoldo Finn, RN  Outcome: Progressing

## 2023-09-08 NOTE — CONSULTS
NIS brief note    Asked to review MRA on recent stroke pt.     Incidental L ICA aneurysm 3mm vs infundibulum      Rec's:  F/u clinic 4-6  weeks

## 2023-09-08 NOTE — DISCHARGE INSTRUCTIONS
You are admitted for the following medical issues/ plan:     PFO- evaluated by cardiology Dr Harriett Brown- and recommending QI for better evaluation, to be scheduled as an outpatient  If no high risk PFO features, then would not recommend PFO closure at this time given her uncontrolled vascular RF  Cardiology to arrange 30 day MCOT, can  from [address] [facility] office, [address]  For incidental L ICA aneurysm 3mm vs infundibulum- follow up with Dr Gabi Rodriguez- neurointerventional team  For acute/ subacute stroke- recommendations from neurology are as follows  Continue aspirin 81 mg daily  - Continue Crestor 40 mg for goal LDL <70  - For hypertriglyceridemia, we discussed diet and exercise changes. You can take OTC Omega-3 fatty acid. If f/u Triglycerides labs in 6 months not improved, PCP should start Lovaza, fenofibrate or alternative  - DM management consult for goal A1c <7  - Goal BP long-term <130/80 given multiple risk factors for stroke  - Needs outpatient sleep study for suspected sleep apnea  The patient needs to follow-up in the Neurology clinic in 6-8 weeks from discharge. The Neurology clinic will call the patient and/or family to schedule an appointment after discharge within the next week. The patient can be seen at the [address] [place] [address],[address] Neurology clinic or another Neurology clinic location.  Please contact the clinic in the interim if any questions/concerns 21 [phone].    4. Follow up with primary care for evaluation and monitoring of diabetes, hypertension, and for GYN eval of suspected Bartholin Cyst in right perineal (genital) region  Diabetic diet  Activity as tolerated  Rx's sent to [facility] [phone]

## 2023-09-09 VITALS
BODY MASS INDEX: 34.65 KG/M2 | TEMPERATURE: 98.7 F | HEART RATE: 72 BPM | OXYGEN SATURATION: 100 % | SYSTOLIC BLOOD PRESSURE: 150 MMHG | RESPIRATION RATE: 16 BRPM | DIASTOLIC BLOOD PRESSURE: 88 MMHG | WEIGHT: 195.55 LBS | HEIGHT: 63 IN

## 2023-09-09 LAB
GLUCOSE BLD STRIP.AUTO-MCNC: 239 MG/DL (ref 65–117)
GLUCOSE BLD STRIP.AUTO-MCNC: 310 MG/DL (ref 65–117)
GLUCOSE BLD STRIP.AUTO-MCNC: 319 MG/DL (ref 65–117)
SERVICE CMNT-IMP: ABNORMAL

## 2023-09-09 PROCEDURE — 6370000000 HC RX 637 (ALT 250 FOR IP): Performed by: FAMILY MEDICINE

## 2023-09-09 PROCEDURE — 6370000000 HC RX 637 (ALT 250 FOR IP)

## 2023-09-09 PROCEDURE — 2580000003 HC RX 258: Performed by: FAMILY MEDICINE

## 2023-09-09 PROCEDURE — 6360000002 HC RX W HCPCS: Performed by: FAMILY MEDICINE

## 2023-09-09 PROCEDURE — 82962 GLUCOSE BLOOD TEST: CPT

## 2023-09-09 RX ORDER — ASPIRIN 81 MG/1
81 TABLET, CHEWABLE ORAL DAILY
Qty: 30 TABLET | Refills: 3 | Status: SHIPPED | OUTPATIENT
Start: 2023-09-10

## 2023-09-09 RX ORDER — METOPROLOL SUCCINATE 100 MG/1
100 TABLET, EXTENDED RELEASE ORAL NIGHTLY
Qty: 30 TABLET | Refills: 3 | Status: SHIPPED | OUTPATIENT
Start: 2023-09-09

## 2023-09-09 RX ORDER — INSULIN LISPRO 100 [IU]/ML
12 INJECTION, SOLUTION INTRAVENOUS; SUBCUTANEOUS
Status: DISCONTINUED | OUTPATIENT
Start: 2023-09-09 | End: 2023-09-09 | Stop reason: HOSPADM

## 2023-09-09 RX ORDER — ROSUVASTATIN CALCIUM 40 MG/1
40 TABLET, COATED ORAL NIGHTLY
Qty: 30 TABLET | Refills: 3 | Status: SHIPPED | OUTPATIENT
Start: 2023-09-09

## 2023-09-09 RX ORDER — AMLODIPINE BESYLATE 10 MG/1
10 TABLET ORAL DAILY
Qty: 30 TABLET | Refills: 3 | Status: SHIPPED | OUTPATIENT
Start: 2023-09-10

## 2023-09-09 RX ORDER — CEPHALEXIN 500 MG/1
500 CAPSULE ORAL 3 TIMES DAILY
Qty: 21 CAPSULE | Refills: 0 | Status: SHIPPED | OUTPATIENT
Start: 2023-09-09 | End: 2023-09-16

## 2023-09-09 RX ORDER — SPIRONOLACTONE 25 MG/1
25 TABLET ORAL DAILY
Qty: 30 TABLET | Refills: 3 | Status: SHIPPED | OUTPATIENT
Start: 2023-09-09

## 2023-09-09 RX ADMIN — Medication 12 UNITS: at 11:21

## 2023-09-09 RX ADMIN — Medication 8 UNITS: at 11:20

## 2023-09-09 RX ADMIN — Medication 12 UNITS: at 16:39

## 2023-09-09 RX ADMIN — Medication 4 UNITS: at 16:38

## 2023-09-09 RX ADMIN — ASPIRIN 81 MG CHEWABLE TABLET 81 MG: 81 TABLET CHEWABLE at 08:49

## 2023-09-09 RX ADMIN — INSULIN GLARGINE 36 UNITS: 100 INJECTION, SOLUTION SUBCUTANEOUS at 08:50

## 2023-09-09 RX ADMIN — AMLODIPINE BESYLATE 10 MG: 5 TABLET ORAL at 08:49

## 2023-09-09 RX ADMIN — Medication 8 UNITS: at 08:51

## 2023-09-09 RX ADMIN — Medication 8 UNITS: at 08:50

## 2023-09-09 RX ADMIN — SPIRONOLACTONE 25 MG: 25 TABLET ORAL at 08:49

## 2023-09-09 RX ADMIN — SODIUM CHLORIDE, PRESERVATIVE FREE 10 ML: 5 INJECTION INTRAVENOUS at 08:53

## 2023-09-09 RX ADMIN — ENOXAPARIN SODIUM 30 MG: 100 INJECTION SUBCUTANEOUS at 08:50

## 2023-09-09 NOTE — PLAN OF CARE
Problem: Discharge Planning  Goal: Discharge to home or other facility with appropriate resources  Outcome: Progressing  Flowsheets (Taken 9/8/2023 2000)  Discharge to home or other facility with appropriate resources: Identify barriers to discharge with patient and caregiver     Problem: Pain  Goal: Verbalizes/displays adequate comfort level or baseline comfort level  Outcome: Progressing     Problem: Chronic Conditions and Co-morbidities  Goal: Patient's chronic conditions and co-morbidity symptoms are monitored and maintained or improved  Outcome: Progressing  Flowsheets (Taken 9/8/2023 2000)  Care Plan - Patient's Chronic Conditions and Co-Morbidity Symptoms are Monitored and Maintained or Improved: Monitor and assess patient's chronic conditions and comorbid symptoms for stability, deterioration, or improvement     Problem: Safety - Adult  Goal: Free from fall injury  Outcome: Progressing

## 2023-09-09 NOTE — PROGRESS NOTES
Pt was discharged home with her . Discharge paperwork, medications, and f/u were discussed. Pt states no other questions or concerns at this time. Bedside RN performed patient education and medication education. Discharge concerns initiated and discussed with patient, including clarification on \"who\" assists the patient at their home and instructions for when the home going patient should call their provider after discharge. Opportunity for questions and clarification was provided. Patient receptive to education:Yes  Patient stated: understanding  Barriers to Education: none  Diagnosis Education given:  Yes    Length of stay: 3  Expected Day of Discharge: 9/9/23  Ask if they have \"Help at Home\" & add to white board?   Yes    Education Day #: 9/9/23    Medication Education Given:  Yes  M in the box Medication name: none    [facility]    Stroke Education documented in Patient Education: Yes  Core Measures Documented in Connect [facility]:  Risk Factors: Yes  Warning signs of stroke: Yes  When to Activate 911: Yes  Medication Education for Risk Factors: Yes  Smoking cessation if applicable: Yes  Written Education Given:  Yes    Discharge NIH Completed: Yes  Score: 0    BRAINS: Yes    Follow Up Appointment Made: Yes  Date/Time if applicable: TBD

## 2023-09-09 NOTE — PLAN OF CARE
Problem: Chronic Conditions and Co-morbidities  Goal: Patient's chronic conditions and co-morbidity symptoms are monitored and maintained or improved  9/8/2023 2221 by Ephraim Pires RN  Outcome: Progressing  Flowsheets (Taken 9/8/2023 2000)  Care Plan - Patient's Chronic Conditions and Co-Morbidity Symptoms are Monitored and Maintained or Improved: Monitor and assess patient's chronic conditions and comorbid symptoms for stability, deterioration, or improvement     Problem: Safety - Adult  Goal: Free from fall injury  9/8/2023 2221 by Ephraim Pires RN  Outcome: Progressing     Problem: Discharge Planning  Goal: Discharge to home or other facility with appropriate resources  9/8/2023 2221 by Ephraim Pires RN  Outcome: Progressing  Flowsheets (Taken 9/8/2023 2000)  Discharge to home or other facility with appropriate resources: Identify barriers to discharge with patient and caregiver     Problem: Pain  Goal: Verbalizes/displays adequate comfort level or baseline comfort level  9/8/2023 2221 by Ephraim Pires RN  Outcome: Progressing

## 2023-09-09 NOTE — PLAN OF CARE
Problem: Discharge Planning  Goal: Discharge to home or other facility with appropriate resources  Outcome: Adequate for Discharge  Flowsheets (Taken 3/4/4760 0404 by Jadon Sanz RN)  Discharge to home or other facility with appropriate resources: Identify barriers to discharge with patient and caregiver     Problem: Pain  Goal: Verbalizes/displays adequate comfort level or baseline comfort level  Outcome: Adequate for Discharge     Problem: Chronic Conditions and Co-morbidities  Goal: Patient's chronic conditions and co-morbidity symptoms are monitored and maintained or improved  Outcome: Adequate for Discharge  Flowsheets (Taken 1/3/3038 9122 by Jadon Sanz RN)  Care Plan - Patient's Chronic Conditions and Co-Morbidity Symptoms are Monitored and Maintained or Improved: Monitor and assess patient's chronic conditions and comorbid symptoms for stability, deterioration, or improvement     Problem: Safety - Adult  Goal: Free from fall injury  Outcome: Adequate for Discharge

## 2023-09-09 NOTE — PROGRESS NOTES
Hospitalist Progress Note  Conrad Mon MD  Answering service: [phone] from in house phone        Date of Service:  2023  NAME:  Neeta North  :  1965  MRN:  [id number]      Admission Summary:   Neeta North is a 66 y.o. female with medical history of DM2, dyslipidemia, hypertension,  lumbar herniated disc who presents as a transfer from [facility] ED for chief complaint of headache, and is being admitted for hypertensive urgency, and stroke. Reports that she was in her usual state of health until she woke up at 4 AM with a headache worse above her left orbit, and visual field impairment. She denies other focal deficit     In the ED, pressure was elevated to 225/101. Other vitals were stable. Labs are significant for BUN 54, creatinine 2.51 (b/l 1.3-1.5), and glucose 478. CT head showed mild left subcortical cortical and subcortical hypoattenuation concerning for acute versus subacute ischemic injury. MRI/MRA shows acute versus subacute infarct in the left PCA territory, and an intracranial left ICA aneurysm. EKG NSR at 82 bpm.     In the ED, she received 10 units regular insulin,100 mg metoprolol, Plavix, and aspirin. She received 1 L normal saline. Interval history / Subjective:   Patient seen and examined  Reviewed her vitals labs test results and care plan  Consults for cardiology and neurointerventional teams today  Brain MRI showing acute/subacute CVA and intracranial left ICA aneurysm     Assessment & Plan:     1. Acute/subacute CVA  Reviewed brain MRI results-   Neurology consulted with recs noted  Cont antiplatelet agent with statin med  PFO noted on ECHO- cardiology consulted for eval and tx recs  2. Intracranial ICA aneurysm on the left  Consulted neurointerventional team for eval and tx recs  3.   Diabetes mellitus uncontrolled  Hemoglobin A1c greater than release tablet 100 mg  100 mg Oral Nightly    sodium chloride 0.9 % bolus 1,000 mL  1,000 mL IntraVENous Once    sodium chloride flush 0.9 % injection 5-40 mL  5-40 mL IntraVENous 2 times per day    sodium chloride flush 0.9 % injection 5-40 mL  5-40 mL IntraVENous PRN    0.9 % sodium chloride infusion   IntraVENous PRN    ondansetron (ZOFRAN-ODT) disintegrating tablet 4 mg  4 mg Oral Q8H PRN    Or    ondansetron (ZOFRAN) injection 4 mg  4 mg IntraVENous Q6H PRN    polyethylene glycol (GLYCOLAX) packet 17 g  17 g Oral Daily PRN    enoxaparin Sodium (LOVENOX) injection 30 mg  30 mg SubCUTAneous Daily    rosuvastatin (CRESTOR) tablet 40 mg  40 mg Oral Nightly    aspirin chewable tablet 81 mg  81 mg Oral Daily    calcium carbonate (TUMS) chewable tablet 500 mg  500 mg Oral TID PRN    hydrALAZINE (APRESOLINE) injection 10 mg  10 mg IntraVENous Q6H PRN    zolpidem (AMBIEN) tablet 10 mg  10 mg Oral Nightly     ______________________________________________________________________  EXPECTED LENGTH OF STAY: Unable to retrieve estimated LOS  ACTUAL LENGTH OF STAY:          2                 Bill Mann MD

## 2023-09-10 NOTE — DISCHARGE SUMMARY
pressure is normal (~3 mmHg). IVC size is normal.   Pericardium No pericardial effusion. Study Details    Image quality: technically difficult. The view(s) performed were parasternal, apical, subcostal and suprasternal. Technically difficult study with poor endocardial visualization, color flow Doppler was performed and pulse wave and/or continuous wave Doppler was performed. Definity contrast was given to enhance imaging. Saline contrast was given to evaluate for intracardiac shunt. Right to left shunt seen at rest. Bubble study was positive.          Greater than 35 minutes were spent with the patient on counseling and coordination of care    Signed:   Conrad Mon MD

## 2023-10-10 ENCOUNTER — OFFICE VISIT (OUTPATIENT)
Age: 58
End: 2023-10-10
Payer: MEDICARE

## 2023-10-10 VITALS
WEIGHT: 197 LBS | SYSTOLIC BLOOD PRESSURE: 118 MMHG | HEIGHT: 63 IN | DIASTOLIC BLOOD PRESSURE: 72 MMHG | BODY MASS INDEX: 34.91 KG/M2 | TEMPERATURE: 97.4 F | OXYGEN SATURATION: 100 % | HEART RATE: 73 BPM

## 2023-10-10 DIAGNOSIS — I67.1 CEREBRAL ANEURYSM: Primary | ICD-10-CM

## 2023-10-10 PROCEDURE — 3074F SYST BP LT 130 MM HG: CPT | Performed by: PSYCHIATRY & NEUROLOGY

## 2023-10-10 PROCEDURE — G8417 CALC BMI ABV UP PARAM F/U: HCPCS | Performed by: PSYCHIATRY & NEUROLOGY

## 2023-10-10 PROCEDURE — 3078F DIAST BP <80 MM HG: CPT | Performed by: PSYCHIATRY & NEUROLOGY

## 2023-10-10 PROCEDURE — 99204 OFFICE O/P NEW MOD 45 MIN: CPT | Performed by: PSYCHIATRY & NEUROLOGY

## 2023-10-10 PROCEDURE — G8428 CUR MEDS NOT DOCUMENT: HCPCS | Performed by: PSYCHIATRY & NEUROLOGY

## 2023-10-10 PROCEDURE — G8484 FLU IMMUNIZE NO ADMIN: HCPCS | Performed by: PSYCHIATRY & NEUROLOGY

## 2023-10-10 PROCEDURE — 3017F COLORECTAL CA SCREEN DOC REV: CPT | Performed by: PSYCHIATRY & NEUROLOGY

## 2023-10-10 PROCEDURE — 1036F TOBACCO NON-USER: CPT | Performed by: PSYCHIATRY & NEUROLOGY

## 2023-10-10 RX ORDER — TIZANIDINE 4 MG/1
4 TABLET ORAL 3 TIMES DAILY PRN
COMMUNITY
Start: 2023-08-28

## 2023-10-10 NOTE — PROGRESS NOTES
New Patient Visit         CONSULT INFORMATION:  Date of Service: 10/10/2023  Consultation Requested by: hospital    PATIENT IDENTIFICATION:  Patient Name: Mandie Martin. Tanisha  : 1965      CC: aneurysm    HISTORY OF PRESENT ILLNESS:  66 y.o. Janyce Diver /  [2]female referred for aneurysm. She was hospitalized in Sept for left PCA stroke. It is considered cryptogeni. Has a PFO and a 30d event monitor is scheduled. She states her vision is much improved. She is on asa. No other focal neuro sx. MRA during w/u found a small left ICA aneurysm. No fam hx of SAH.        Past Medical History:   Diagnosis Date    Diabetes ([address])     Headache     Hyperlipidemia     Hypertension     Lumbar herniated disc     Lupus (HCC)     Pt denies h/o Lupus 23    Muscle pain     Neuropathy     Snoring     Stroke [facility])     [id number]    Visual disturbance        Past Surgical History:   Procedure Laterality Date     SECTION      CHOLECYSTECTOMY      HYSTERECTOMY (CERVIX STATUS UNKNOWN)      TUBAL LIGATION         Current Outpatient Medications   Medication Sig    amLODIPine (NORVASC) 10 MG tablet Take 1 tablet by mouth daily    metoprolol succinate (TOPROL XL) 100 MG extended release tablet Take 1 tablet by mouth nightly    spironolactone (ALDACTONE) 25 MG tablet Take 1 tablet by mouth daily    aspirin 81 MG chewable tablet Take 1 tablet by mouth daily    glucose 4 g chewable tablet Take 4 tablets by mouth as needed for Low blood sugar    rosuvastatin (CRESTOR) 40 MG tablet Take 1 tablet by mouth nightly    insulin aspart (NOVOLOG FLEXPEN) 100 UNIT/ML injection pen Inject 10 Units into the skin 3 times daily (before meals)    Insulin Pen Needle (PEN NEEDLES 31GX5/16\") 31G X 8 MM MISC 1 each by Does not apply route daily Place a NEW needle with each injection    insulin glargine (LANTUS SOLOSTAR) 100 UNIT/ML injection pen Inject 45 Units into the skin daily    ergocalciferol (ERGOCALCIFEROL) 1.25 MG (87379 UT) capsule

## 2023-10-10 NOTE — PROGRESS NOTES
New patient referred by [facility] [place] presenting with Cerebral aneurysm and s/p CVA. Spouse at visit with patient. Patient reports frequent headache above left eye and blurred vision in left eye. Denies dizziness, double vision, numbness or tingling, n/v.  No acute problems voiced.

## 2023-10-10 NOTE — PATIENT INSTRUCTIONS
Follow up in 1 year, September 2024, after imaging is completed. See attached paperwork to schedule imaging.

## 2023-10-18 ENCOUNTER — ANESTHESIA (OUTPATIENT)
Facility: HOSPITAL | Age: 58
End: 2023-10-18
Payer: MEDICARE

## 2023-10-18 ENCOUNTER — HOSPITAL ENCOUNTER (OUTPATIENT)
Facility: HOSPITAL | Age: 58
End: 2023-10-18
Attending: INTERNAL MEDICINE
Payer: MEDICARE

## 2023-10-18 ENCOUNTER — HOSPITAL ENCOUNTER (OUTPATIENT)
Facility: HOSPITAL | Age: 58
Discharge: HOME | End: 2023-10-20
Attending: INTERNAL MEDICINE
Payer: MEDICARE

## 2023-10-18 ENCOUNTER — ANESTHESIA EVENT (OUTPATIENT)
Facility: HOSPITAL | Age: 58
End: 2023-10-18
Payer: MEDICARE

## 2023-10-18 VITALS
TEMPERATURE: 98.9 F | RESPIRATION RATE: 17 BRPM | DIASTOLIC BLOOD PRESSURE: 81 MMHG | WEIGHT: 197 LBS | SYSTOLIC BLOOD PRESSURE: 148 MMHG | BODY MASS INDEX: 34.91 KG/M2 | HEIGHT: 63 IN | OXYGEN SATURATION: 93 % | HEART RATE: 65 BPM

## 2023-10-18 DIAGNOSIS — Q21.12 FO (FORAMEN OVALE): ICD-10-CM

## 2023-10-18 LAB
ECHO AO ASC DIAM: 3.3 CM
ECHO AO ASCENDING AORTA INDEX: 1.72 CM/M2
ECHO AO ROOT DIAM: 3.1 CM
ECHO AO ROOT INDEX: 1.61 CM/M2
ECHO BSA: 1.99 M2
ECHO EST RA PRESSURE: 5 MMHG
ECHO RIGHT VENTRICULAR SYSTOLIC PRESSURE (RVSP): 29 MMHG
ECHO TV REGURGITANT MAX VELOCITY: 2.46 M/S
ECHO TV REGURGITANT PEAK GRADIENT: 24 MMHG

## 2023-10-18 PROCEDURE — 2500000003 HC RX 250 WO HCPCS: Performed by: NURSE ANESTHETIST, CERTIFIED REGISTERED

## 2023-10-18 PROCEDURE — C8925 2D TEE W OR W/O FOL W/CON,IN: HCPCS

## 2023-10-18 PROCEDURE — 6360000002 HC RX W HCPCS: Performed by: NURSE ANESTHETIST, CERTIFIED REGISTERED

## 2023-10-18 PROCEDURE — 3700000000 HC ANESTHESIA ATTENDED CARE

## 2023-10-18 PROCEDURE — 2580000003 HC RX 258: Performed by: NURSE ANESTHETIST, CERTIFIED REGISTERED

## 2023-10-18 PROCEDURE — 3700000001 HC ADD 15 MINUTES (ANESTHESIA)

## 2023-10-18 RX ORDER — LIDOCAINE HYDROCHLORIDE 20 MG/ML
INJECTION, SOLUTION EPIDURAL; INFILTRATION; INTRACAUDAL; PERINEURAL PRN
Status: DISCONTINUED | OUTPATIENT
Start: 2023-10-18 | End: 2023-10-18 | Stop reason: SDUPTHER

## 2023-10-18 RX ORDER — SODIUM CHLORIDE 9 MG/ML
INJECTION, SOLUTION INTRAVENOUS CONTINUOUS PRN
Status: DISCONTINUED | OUTPATIENT
Start: 2023-10-18 | End: 2023-10-18 | Stop reason: SDUPTHER

## 2023-10-18 RX ADMIN — SODIUM CHLORIDE: 9 INJECTION, SOLUTION INTRAVENOUS at 15:10

## 2023-10-18 RX ADMIN — LIDOCAINE HYDROCHLORIDE 100 MG: 20 INJECTION, SOLUTION EPIDURAL; INFILTRATION; INTRACAUDAL; PERINEURAL at 15:22

## 2023-10-18 RX ADMIN — PROPOFOL 50 MCG/KG/MIN: 10 INJECTION, EMULSION INTRAVENOUS at 15:22

## 2023-10-18 NOTE — H&P
tablet Take 1 tablet by mouth nightly 9/9/23   Jose Antonio Hill MD   spironolactone (ALDACTONE) 25 MG tablet Take 1 tablet by mouth daily 9/9/23   Jose Antonio Hill MD   aspirin 81 MG chewable tablet Take 1 tablet by mouth daily 9/10/23   Jose Antonio Hill MD   glucose 4 g chewable tablet Take 4 tablets by mouth as needed for Low blood sugar 9/9/23   Jose Antonio Hill MD   rosuvastatin (CRESTOR) 40 MG tablet Take 1 tablet by mouth nightly 9/9/23   Jose Antonio Hill MD   insulin aspart (NOVOLOG FLEXPEN) 100 UNIT/ML injection pen Inject 10 Units into the skin 3 times daily (before meals) 9/8/23   Piedad Saltness, APRN - CNS   Insulin Pen Needle (PEN NEEDLES 31GX5/16\") 31G X 8 MM MISC 1 each by Does not apply route daily Place a NEW needle with each injection 9/8/23   Piedad Saltness, APRN - CNS   insulin glargine (LANTUS SOLOSTAR) 100 UNIT/ML injection pen Inject 45 Units into the skin daily 9/8/23   [place] Saltness, APRN - CNS   ergocalciferol (ERGOCALCIFEROL) 1.25 MG (25374 UT) capsule ceived the following from [address] [facility]: Outside name: ergocalciferol (ERGOCALCIFEROL) 50,000 unit capsule 12/13/17   Automatic Reconciliation, Ar   zolpidem (AMBIEN) 10 MG tablet nightly. 6/26/19   Automatic Reconciliation, Ar         Physical Exam:  Overall VSSAF; There were no vitals taken for this visit. No data recorded. Physical Exam  GEN: NAD, appears stated age  HEENT: EOMI   NECK: Normal JVP, carotids 2+ b/l and symmetrical  CV: RRR, normal S1 and S2, no M/R/G  LUNGS: CTAB, no W/R/R  ABD: Soft, ND  EXT: No edema, 2+ and symmetrical radial pulses b/l  PSYCH: Mood and affect normal  NEURO: Alert, MAEW, face symmetrical, speech intact    Labs: per outpatient records  CXR: per outpatient records    Plan of Care/Planned Procedure:  Risks, benefits, and alternatives reviewed with patient and she agrees to proceed with the procedure. For other plans, see orders.     Plan QI with MAC

## 2023-10-18 NOTE — PROGRESS NOTES
Patient has returned to baseline at arrival for procedure. Discussed AVS and discharge instructions with patient and provided a copy take home. Pt voiced understanding and denied any questions or need for clarification. IV D/C'd and hemostasis attained. Coban and gauze dressing applied.     Transported with:  Nurse via W/C to vehicle driven by

## 2023-10-18 NOTE — PROGRESS NOTES
Pt arrives ambulatory to noninvasive cardiology department accompanied by  for QI procedure. All assessments completed and consent was reviewed. Education given was regarding procedure, sedation medications to be given, potential side effects of medications to be given, post-procedure management and follow-up. Opportunity for questions was provided and all questions and concerns were addressed. Patient and patient contact verbalized understanding of education.

## 2023-10-19 NOTE — ANESTHESIA POSTPROCEDURE EVALUATION
Department of Anesthesiology  Postprocedure Note    Patient: Nhung Sagastume  MRN: [id number]  YOB: 1965  Date of evaluation: 10/19/2023      Procedure Summary     Date: 10/18/23 Room / Location: [address] [place] [address] NON-INVASIVE CARDIOLOGY    Anesthesia Start: 0197 Anesthesia Stop: 0108    Procedure: QI (PRN CONTRAST/BUBBLE/3D) Diagnosis: FO (foramen ovale)    Scheduled Providers: Buzz Pete MD; Kiet Mon MD Responsible Provider: Margarita Tracey MD    Anesthesia Type: MAC ASA Status: 3          Anesthesia Type: No value filed.     Alejandra Phase I: Alejandra Score: 10    Alejandra Phase II:        Anesthesia Post Evaluation    Patient location during evaluation: PACU  Level of consciousness: awake  Airway patency: patent  Nausea & Vomiting: no nausea  Complications: no  Cardiovascular status: blood pressure returned to baseline  Respiratory status: acceptable  Hydration status: euvolemic  Comments: --------------------            10/18/23               1610     --------------------   BP:                  Pulse:      65       Resp:       17       Temp:                SpO2:      93%      --------------------

## 2024-01-31 ENCOUNTER — HOSPITAL ENCOUNTER (OUTPATIENT)
Facility: HOSPITAL | Age: 59
Discharge: HOME | End: 2024-02-03
Attending: INTERNAL MEDICINE
Payer: MEDICARE

## 2024-01-31 DIAGNOSIS — N18.4 CHRONIC KIDNEY DISEASE, STAGE IV (SEVERE) (HCC): ICD-10-CM

## 2024-01-31 PROCEDURE — 76770 US EXAM ABDO BACK WALL COMP: CPT

## 2024-02-20 ENCOUNTER — HOSPITAL ENCOUNTER (EMERGENCY)
Facility: HOSPITAL | Age: 59
Discharge: HOME | End: 2024-02-20
Attending: EMERGENCY MEDICINE
Payer: MEDICARE

## 2024-02-20 VITALS
BODY MASS INDEX: 37.71 KG/M2 | RESPIRATION RATE: 16 BRPM | WEIGHT: 220.9 LBS | HEART RATE: 75 BPM | DIASTOLIC BLOOD PRESSURE: 82 MMHG | OXYGEN SATURATION: 99 % | TEMPERATURE: 98.9 F | SYSTOLIC BLOOD PRESSURE: 144 MMHG | HEIGHT: 64 IN

## 2024-02-20 DIAGNOSIS — J02.9 PHARYNGITIS, UNSPECIFIED ETIOLOGY: Primary | ICD-10-CM

## 2024-02-20 LAB — DEPRECATED S PYO AG THROAT QL EIA: NEGATIVE

## 2024-02-20 PROCEDURE — 6370000000 HC RX 637 (ALT 250 FOR IP): Performed by: EMERGENCY MEDICINE

## 2024-02-20 PROCEDURE — 87880 STREP A ASSAY W/OPTIC: CPT

## 2024-02-20 PROCEDURE — 87147 CULTURE TYPE IMMUNOLOGIC: CPT

## 2024-02-20 PROCEDURE — 87070 CULTURE OTHR SPECIMN AEROBIC: CPT

## 2024-02-20 PROCEDURE — 99283 EMERGENCY DEPT VISIT LOW MDM: CPT

## 2024-02-20 RX ORDER — LIDOCAINE HYDROCHLORIDE 20 MG/ML
15 SOLUTION OROPHARYNGEAL PRN
Qty: 100 ML | Refills: 0 | Status: SHIPPED | OUTPATIENT
Start: 2024-02-20

## 2024-02-20 RX ADMIN — Medication 40 ML: at 09:11

## 2024-02-20 ASSESSMENT — PAIN DESCRIPTION - PAIN TYPE: TYPE: ACUTE PAIN

## 2024-02-20 ASSESSMENT — PAIN DESCRIPTION - FREQUENCY: FREQUENCY: CONTINUOUS

## 2024-02-20 ASSESSMENT — PAIN DESCRIPTION - LOCATION: LOCATION: THROAT

## 2024-02-20 ASSESSMENT — PAIN - FUNCTIONAL ASSESSMENT: PAIN_FUNCTIONAL_ASSESSMENT: 0-10

## 2024-02-20 ASSESSMENT — PAIN SCALES - GENERAL
PAINLEVEL_OUTOF10: 5
PAINLEVEL_OUTOF10: 3

## 2024-02-20 ASSESSMENT — PAIN DESCRIPTION - DESCRIPTORS: DESCRIPTORS: BURNING

## 2024-02-20 NOTE — ED PROVIDER NOTES
[facility]  EMERGENCY DEPARTMENT ENCOUNTER      Pt Name: Lakshmi Garay  MRN: [id number]  Birthdate 1965  Date of evaluation: 2024  Provider: Faisal Santo MD    CHIEF COMPLAINT       Chief Complaint   Patient presents with    Pharyngitis     Presented to the ED with a sore throat for 2 days. Dysphagia and scratchy throat. Denies fevers.         HISTORY OF PRESENT ILLNESS   (Location/Symptom, Timing/Onset, Context/Setting, Quality, Duration, Modifying Factors, Severity)  Note limiting factors.   58-year-old with a history of hypertension, diabetes, hyperlipidemia, neuropathy, stroke.  She presents with a 1 day history of sore throat.  She states that she first noted some difficulty swallowing yesterday.  Today her throat has become sore.  She denies any fever, cough, congestion, lymphadenopathy.  She has noted some liquid coming up from her stomach into her throat recently.  She reports a history of reflux where she has had to take Tums.  No nausea, vomiting, diarrhea.          Review of External Medical Records:     Nursing Notes were reviewed.    REVIEW OF SYSTEMS    (2-9 systems for level 4, 10 or more for level 5)     Review of Systems    Except as noted above the remainder of the review of systems was reviewed and negative.       PAST MEDICAL HISTORY     Past Medical History:   Diagnosis Date    Diabetes (HCC)     Headache     Hyperlipidemia     Hypertension     Lumbar herniated disc     Lupus (HCC)     Pt denies h/o Lupus 23    Muscle pain     Neuropathy     Snoring     Stroke (HCC)         Visual disturbance          SURGICAL HISTORY       Past Surgical History:   Procedure Laterality Date     SECTION      CHOLECYSTECTOMY      HYSTERECTOMY (CERVIX STATUS UNKNOWN)      TUBAL LIGATION           CURRENT MEDICATIONS       Previous Medications    AMLODIPINE (NORVASC) 10 MG TABLET    Take 1 tablet by mouth daily    ASPIRIN 81 MG CHEWABLE TABLET    Take 1 tablet by mouth

## 2024-02-22 LAB
BACTERIA SPEC CULT: ABNORMAL
BACTERIA SPEC CULT: ABNORMAL
SERVICE CMNT-IMP: ABNORMAL

## 2024-02-22 RX ORDER — AMOXICILLIN 500 MG/1
500 TABLET, FILM COATED ORAL 2 TIMES DAILY
Qty: 20 TABLET | Refills: 0 | Status: SHIPPED | OUTPATIENT
Start: 2024-02-22

## 2024-09-19 ENCOUNTER — HOSPITAL ENCOUNTER (OUTPATIENT)
Facility: HOSPITAL | Age: 59
Discharge: HOME | End: 2024-09-22
Attending: PSYCHIATRY & NEUROLOGY
Payer: MEDICARE

## 2024-09-19 DIAGNOSIS — I67.1 CEREBRAL ANEURYSM: ICD-10-CM

## 2024-09-19 PROCEDURE — 70544 MR ANGIOGRAPHY HEAD W/O DYE: CPT

## 2024-09-26 ENCOUNTER — TELEPHONE (OUTPATIENT)
Age: 59
End: 2024-09-26

## 2024-09-26 DIAGNOSIS — I67.1 CEREBRAL ANEURYSM: Primary | ICD-10-CM
